# Patient Record
Sex: FEMALE | Race: WHITE | URBAN - METROPOLITAN AREA
[De-identification: names, ages, dates, MRNs, and addresses within clinical notes are randomized per-mention and may not be internally consistent; named-entity substitution may affect disease eponyms.]

---

## 2018-11-19 ENCOUNTER — HISTORICAL (OUTPATIENT)
Dept: ADMINISTRATIVE | Facility: HOSPITAL | Age: 73
End: 2018-11-19

## 2018-12-17 ENCOUNTER — HISTORICAL (OUTPATIENT)
Dept: ADMINISTRATIVE | Facility: HOSPITAL | Age: 73
End: 2018-12-17

## 2020-01-13 ENCOUNTER — HISTORICAL (OUTPATIENT)
Dept: CARDIOLOGY | Facility: HOSPITAL | Age: 75
End: 2020-01-13

## 2022-04-30 NOTE — OP NOTE
Patient:   Tami Howard             MRN: [id number]            FIN: [id number]               Age:   72 years     Sex:  Female     :  1945   Associated Diagnoses:   None   Author:   Shwetha LAST, Glenda GAR      NAME: Tami Howard  SURGEON:  Glenda Tadeo MD    YOB: 1945  DATE OF SURGERY:2018    PREOPERATIVE DIAGNOSIS:  Cataract, right eye.    POSTOPERATIVE DIAGNOSIS:  Cataract, right eye.    PROCEDURE:  Cataract extraction with intraocular lens placement, right eye.    HISTORY:  The patient is a 72-year-old female, who presented to the clinic with a 2+ nuclear sclerotic cataract causing difficulty in patient's activities of daily living. After thorough discussion of risks, benefits, alternatives and complications, the patient expressed understanding and wished to proceed with cataract extraction.  Goal of distance verified at bedside.  Vision blue will be used to aid in visualization.    PROCEDURE IN DETAIL:  On the day of surgery patient was brought to the preoperative holding area, where patient was given five drops each of phenylephrine, tropicamide and Cyclopentolate into the operative eye.  Patient was then brought to the operating room where patient was given Marcaine drops into the operative eye.  Patient was prepped and draped in normal sterile fashion.  A lid speculum was inserted.  Using operating microscope, 1.0-mm keratome was used to create a side port wound through which 1% lidocaine was injected, followed by air, Vision Blue, BSS, then Viscoat.  A 2.65 mm keratome was used to create triplanar phacoemulsification wound, through which continuous tear capsulorrhexis was performed using Utrata forceps and a cystotome. Hydrodissection and delineation were performed until lens was rotating freely in capsular bag.  Phacoemulsification was carried out in divide and conquer fashion to remove nuclear and epinuclear fragments.  I/A was used to remove cortex carefully.   Healon was injected into the capsular bag, which was found to be free of tears or defect.  A ZCBOO +17.5 diopter lens was inserted and carefully rotated into place.  Viscoelastic was removed from the eye.  Wounds were sealed using hydration. Lens was in excellent position at end of case.  Vigamox, Prednisolone, and Maxitrol ointment was placed into the patient's eye, which was patched and shielded.  The patient tolerated the procedure well and will followup tomorrow in clinic.

## 2022-04-30 NOTE — OP NOTE
Patient:   Tami Howard             MRN: [id number]            FIN: [id number]               Age:   73 years     Sex:  Female     :  1945   Associated Diagnoses:   None   Author:   Shwetha LAST, Glenda GAR      NAME: Tami Howard  SURGEON:  Glenda Tadeo MD    YOB: 1945  DATE OF SURGERY:18    PREOPERATIVE DIAGNOSIS:  Cataract, left eye.    POSTOPERATIVE DIAGNOSIS:  Cataract, left eye.    PROCEDURE:  Cataract extraction with intraocular lens placement, left eye.    HISTORY:  The patient is a 73-year-old female, who presented to the clinic with a 2+ nuclear sclerotic cataract causing difficulty in patient's activities of daily living. After thorough discussion of risks, benefits, alternatives and complications, the patient expressed understanding and wished to proceed with cataract extraction.  Goal of -0.40-0.50 verified at bedside.  Vision blue will be used to aid in visualization.    PROCEDURE IN DETAIL:  On the day of surgery patient was brought to the preoperative holding area, where patient was given five drops each of phenylephrine, tropicamide and Cyclopentolate into the operative eye.  Patient was then brought to the operating room where patient was given Marcaine drops into the operative eye.  Patient was prepped and draped in normal sterile fashion.  A lid speculum was inserted.  Using operating microscope, 1.0-mm keratome was used to create a side port wound through which 1% lidocaine was injected, followed by air, Vision Blue, BSS, then Viscoat.  A 2.65 mm keratome was used to create triplanar phacoemulsification wound, through which continuous tear capsulorrhexis was performed using Utrata forceps and a cystotome. Hydrodissection and delineation were performed until lens was rotating freely in capsular bag.  Phacoemulsification was carried out in divide and conquer fashion to remove nuclear and epinuclear fragments.  I/A was used to remove cortex carefully.   Healon was injected into the capsular bag, which was found to be free of tears or defect.  A ZCBOO +18.0 diopter lens was inserted and carefully rotated into place.  Viscoelastic was removed from the eye.  Wounds were sealed using hydration. Lens was in excellent position at end of case.  Vigamox, Prednisolone, and Maxitrol ointment was placed into the patient's eye, which was shielded.  The patient tolerated the procedure well and will followup tomorrow in clinic.

## 2024-10-07 DIAGNOSIS — M25.561 RIGHT KNEE PAIN: Primary | ICD-10-CM

## 2024-10-30 ENCOUNTER — OFFICE VISIT (OUTPATIENT)
Dept: ORTHOPEDICS | Facility: CLINIC | Age: 79
End: 2024-10-30
Payer: MEDICARE

## 2024-10-30 VITALS
BODY MASS INDEX: 31.55 KG/M2 | HEIGHT: 67 IN | WEIGHT: 201 LBS | SYSTOLIC BLOOD PRESSURE: 109 MMHG | DIASTOLIC BLOOD PRESSURE: 80 MMHG | HEART RATE: 64 BPM

## 2024-10-30 DIAGNOSIS — M17.11 PRIMARY OSTEOARTHRITIS OF RIGHT KNEE: Primary | ICD-10-CM

## 2024-10-30 DIAGNOSIS — Z86.718 HISTORY OF DVT OF LOWER EXTREMITY: ICD-10-CM

## 2024-10-30 PROCEDURE — 99202 OFFICE O/P NEW SF 15 MIN: CPT | Mod: ,,, | Performed by: PHYSICIAN ASSISTANT

## 2024-10-30 RX ORDER — MONTELUKAST SODIUM 10 MG/1
10 TABLET ORAL DAILY PRN
COMMUNITY
Start: 2024-07-24

## 2024-10-30 RX ORDER — ROPINIROLE 1 MG/1
3 TABLET, FILM COATED ORAL NIGHTLY
COMMUNITY
Start: 2024-09-22

## 2024-10-30 RX ORDER — SERTRALINE HYDROCHLORIDE 100 MG/1
200 TABLET, FILM COATED ORAL
COMMUNITY
Start: 2024-05-30

## 2024-10-30 RX ORDER — METOPROLOL SUCCINATE 25 MG/1
TABLET, EXTENDED RELEASE ORAL
COMMUNITY
Start: 2024-10-30

## 2024-10-30 RX ORDER — HYDROCHLOROTHIAZIDE 25 MG/1
25 TABLET ORAL
COMMUNITY
Start: 2024-10-25

## 2024-10-30 RX ORDER — LOSARTAN POTASSIUM 100 MG/1
100 TABLET ORAL
COMMUNITY
Start: 2024-09-22

## 2025-01-06 ENCOUNTER — ANESTHESIA EVENT (OUTPATIENT)
Dept: SURGERY | Facility: HOSPITAL | Age: 80
End: 2025-01-06
Payer: MEDICARE

## 2025-01-06 ENCOUNTER — HOSPITAL ENCOUNTER (OUTPATIENT)
Dept: RADIOLOGY | Facility: HOSPITAL | Age: 80
Discharge: HOME | End: 2025-01-06
Attending: PHYSICIAN ASSISTANT
Payer: MEDICARE

## 2025-01-06 ENCOUNTER — OFFICE VISIT (OUTPATIENT)
Dept: ORTHOPEDICS | Facility: CLINIC | Age: 80
End: 2025-01-06
Payer: MEDICARE

## 2025-01-06 VITALS
OXYGEN SATURATION: 99 % | WEIGHT: 201 LBS | BODY MASS INDEX: 31.55 KG/M2 | HEIGHT: 67 IN | SYSTOLIC BLOOD PRESSURE: 123 MMHG | HEART RATE: 71 BPM | DIASTOLIC BLOOD PRESSURE: 78 MMHG

## 2025-01-06 DIAGNOSIS — M17.11 PRIMARY OSTEOARTHRITIS OF RIGHT KNEE: Primary | ICD-10-CM

## 2025-01-06 DIAGNOSIS — Z01.812 PRE-OPERATIVE LABORATORY EXAMINATION: ICD-10-CM

## 2025-01-06 DIAGNOSIS — M17.11 PRIMARY OSTEOARTHRITIS OF RIGHT KNEE: ICD-10-CM

## 2025-01-06 DIAGNOSIS — R79.9 ABNORMAL BLOOD CHEMISTRY: ICD-10-CM

## 2025-01-06 LAB — MRSA PCR SCRN (OHS): NOT DETECTED

## 2025-01-06 PROCEDURE — 73700 CT LOWER EXTREMITY W/O DYE: CPT | Mod: TC,RT

## 2025-01-06 PROCEDURE — 71046 X-RAY EXAM CHEST 2 VIEWS: CPT | Mod: TC

## 2025-01-06 PROCEDURE — 99213 OFFICE O/P EST LOW 20 MIN: CPT | Mod: PBBFAC

## 2025-01-06 PROCEDURE — 99213 OFFICE O/P EST LOW 20 MIN: CPT | Mod: S$PBB,,, | Performed by: SPECIALIST

## 2025-01-06 RX ORDER — ACETAMINOPHEN 500 MG
1000 TABLET ORAL
OUTPATIENT
Start: 2025-01-06

## 2025-01-06 RX ORDER — KETOROLAC TROMETHAMINE 10 MG/1
10 TABLET, FILM COATED ORAL
OUTPATIENT
Start: 2025-01-06 | End: 2025-01-06

## 2025-01-06 RX ORDER — TRANEXAMIC ACID 650 MG/1
1950 TABLET ORAL
OUTPATIENT
Start: 2025-01-06 | End: 2025-01-06

## 2025-01-06 RX ORDER — SODIUM CHLORIDE, SODIUM GLUCONATE, SODIUM ACETATE, POTASSIUM CHLORIDE AND MAGNESIUM CHLORIDE 30; 37; 368; 526; 502 MG/100ML; MG/100ML; MG/100ML; MG/100ML; MG/100ML
INJECTION, SOLUTION INTRAVENOUS CONTINUOUS
OUTPATIENT
Start: 2025-01-06

## 2025-01-06 RX ORDER — TAMSULOSIN HYDROCHLORIDE 0.4 MG/1
0.4 CAPSULE ORAL
OUTPATIENT
Start: 2025-01-06

## 2025-01-06 RX ORDER — ONDANSETRON 4 MG/1
4 TABLET, ORALLY DISINTEGRATING ORAL
OUTPATIENT
Start: 2025-01-06

## 2025-01-06 RX ORDER — GABAPENTIN 100 MG/1
300 CAPSULE ORAL
OUTPATIENT
Start: 2025-01-06

## 2025-01-06 RX ORDER — SCOLOPAMINE TRANSDERMAL SYSTEM 1 MG/1
1 PATCH, EXTENDED RELEASE TRANSDERMAL ONCE AS NEEDED
OUTPATIENT
Start: 2025-01-06 | End: 2036-06-04

## 2025-01-06 NOTE — PROGRESS NOTES
Past Medical History:   Diagnosis Date    Arthritis     HTN (hypertension)     Mild intermittent asthma, uncomplicated     Restless leg     Sleep apnea, unspecified        Past Surgical History:   Procedure Laterality Date    ARTHROSCOPY,KNEE,WITH MENISCUS REPAIR Left      SECTION      And     CHOLECYSTECTOMY      EYE SURGERY      HYSTERECTOMY      JOINT REPLACEMENT      Lt knee    NASAL SINUS SURGERY      SHOULDER SURGERY Left     tailbone surgery      TOTAL KNEE ARTHROPLASTY Left        Current Outpatient Medications   Medication Sig    hydroCHLOROthiazide (HYDRODIURIL) 25 MG tablet Take 25 mg by mouth.    losartan (COZAAR) 100 MG tablet Take 100 mg by mouth.    metoprolol succinate (TOPROL-XL) 25 MG 24 hr tablet     montelukast (SINGULAIR) 10 mg tablet Take 10 mg by mouth daily as needed.    rOPINIRole (REQUIP) 1 MG tablet Take 3 mg by mouth every evening.    sertraline (ZOLOFT) 100 MG tablet Take 200 mg by mouth.     No current facility-administered medications for this visit.       Review of patient's allergies indicates:  No Known Allergies    Family History   Problem Relation Name Age of Onset    Asthma Mother Leydi Saini Tonny     Miscarriages / Stillbirths Father Jayshree Tonny     Depression Sister  Terri Ludwig     Hypertension Brother Warren Lancaster        Social History     Socioeconomic History    Marital status:    Tobacco Use    Smoking status: Never    Smokeless tobacco: Never   Substance and Sexual Activity    Alcohol use: Never    Drug use: Never    Sexual activity: Not Currently     Birth control/protection: Condom       Chief Complaint:   Chief Complaint   Patient presents with    Right Knee - Pre-op Exam       Consulting Physician: No ref. provider found    History of present illness:    This is a 79 y.o. year old female who complains of pain in the right knee secondary to advanced osteoarthrosis.  She has had left total knee arthroplasty in the past by   "Elieser Ayon in [place], [place].  The left total knee was 1 year ago.  She has had multiple corticosteroid injections as well as arthroscopy and physical therapy for her right knee.  The pain persists and she is ready to proceed with right total knee arthroplasty.    Review of Systems:    Constitution:   Denies chills, fever, and sweats.  HENT:   Denies headaches or blurry vision.  Cardiovascular:  Denies chest pain or irregular heart beat.  Respiratory:   Denies cough or shortness of breath.  Gastrointestinal:  Denies abdominal pain, nausea, or vomiting.  Musculoskeletal:   Denies muscle cramps.  Neurological:   Denies dizziness or focal weakness.  Psychiatric/Behavior: Normal mental status.  Hematology/Lymph:  Denies bleeding problem or easy bruising/bleeding.  Skin:    Denies rash or suspicious lesions.    Examination:    Vital Signs:    Vitals:    01/06/25 0934   BP: 123/78   Pulse: 71   SpO2: 99%   Weight: 91.2 kg (201 lb)   Height: 5' 7" (1.702 m)   PainSc:   9   PainLoc: Knee       Body mass index is 31.48 kg/m².    Constitution:   Well-developed, well nourished patient in no acute distress.  Neurological:   Alert and oriented x 3 and cooperative to examination.     Psychiatric/Behavior: Normal mental status.  Respiratory:   No shortness of breath.non labored breathing.  Cardiovascular: Regular rate and rhythm  Eyes:    Extraoccular muscles intact  Skin:    No scars, rash or suspicious lesions.    Physical Exam: Physical exam  General exam:  Well groomed, well nourished, no acute distress  Alert and oriented x3  HEENT:  Pupils are equal, round, and reactive to light, normocephalic, atraumatic  Cardiovascular:  S1 and S2 heard, no murmurs  Pulmonary:  Lungs clear to auscultation bilaterally  Gastrointestinal:  Positive bowel sounds, soft, nontender, nondistended        General Musculoskeletal Exam   Gait: antalgic       Right Knee Exam     Inspection   Erythema: absent  Effusion: present  Deformity: " present  Bruising: absent    Tenderness   The patient is tender to palpation of the medial joint line, MCL, condyle and medial retinaculum.    Crepitus   The patient has crepitus of the medial joint line.    Range of Motion   Extension: abnormal   Flexion: abnormal   10° to 110°    Tests   Meniscus   Felipe:  Medial - positive Lateral - negative  Ligament Examination   MCL - Valgus: normal (0 to 2mm)  LCL - Varus: normal  Patella   Passive Patellar Tilt: neutral    Other   Sensation: normal    Comments:  Varus deformity    Muscle Strength   Right Lower Extremity   Quadriceps:  5/5   Hamstrin/5     Vascular Exam     Right Pulses  Dorsalis Pedis:      2+  Posterior Tibial:      2+          Imaging: X-rays ordered and images interpreted today personally by me of four views of the right and left knees which show pristine interfaces and stable well-aligned left total knee arthroplasty.  The right knee is bone-on-bone in the medial compartment on weight-bearing films with grade 4 changes and sclerosis and associated varus deformity with tricompartmental osteophytes.  Impression: Advanced osteoarthrosis right knee and stable well-aligned left total knee arthroplasty.         Assessment: Primary osteoarthritis of right knee        Plan:  Robotic assisted right total knee arthroplasty    Risks, benefits, alternatives, and complications were explained to the patient and/or patient representative. They understand, agree, and want to proceed with the operation/ procedure.    History of DVT in the past the patient will be on Eliquis postoperatively for DVT prophylaxis.      DISCLAIMER: This note may have been dictated using voice recognition software and may contain grammatical errors.     NOTE: Consult report sent to referring provider via [email or web address].

## 2025-01-13 RX ORDER — IBUPROFEN 200 MG
200 TABLET ORAL EVERY 6 HOURS PRN
Status: ON HOLD | COMMUNITY
End: 2025-02-13 | Stop reason: HOSPADM

## 2025-01-13 RX ORDER — ALBUTEROL SULFATE 90 UG/1
1 INHALANT RESPIRATORY (INHALATION) EVERY 6 HOURS PRN
COMMUNITY
Start: 2024-12-17

## 2025-01-13 RX ORDER — MULTIVITAMIN
1 TABLET ORAL DAILY
COMMUNITY

## 2025-01-13 NOTE — CLINICAL REVIEW
labs/EKG/CXR reviewed. cardiology notes utd. Echo/Stress/Angio noted. CRA provided. chart review complete. ccv

## 2025-01-23 ENCOUNTER — ANESTHESIA (OUTPATIENT)
Dept: SURGERY | Facility: HOSPITAL | Age: 80
End: 2025-01-23
Payer: MEDICARE

## 2025-01-28 ENCOUNTER — TELEPHONE (OUTPATIENT)
Dept: ORTHOPEDICS | Facility: CLINIC | Age: 80
End: 2025-01-28
Payer: MEDICARE

## 2025-01-28 NOTE — TELEPHONE ENCOUNTER
Called pt to get her rescheduled for her sx that was cancelled due to weather on 1/23/25.    Pt did not answer, left detailed VM stating to call her office at her convenience to get this rescheduled.     Awaiting call back at this time.     Pt called back to reschedule her sx.    Called her back with a date, new sx date of 2/12/25 was agreed upon with pt and informed to call if she has any other questions or concerns prior to this date.    She verbalized a clear understanding.

## 2025-02-12 ENCOUNTER — HOSPITAL ENCOUNTER (INPATIENT)
Facility: HOSPITAL | Age: 80
LOS: 2 days | Discharge: HOME HEALTH | DRG: 470 | End: 2025-02-15
Attending: SPECIALIST | Admitting: SPECIALIST
Payer: MEDICARE

## 2025-02-12 DIAGNOSIS — M17.11 PRIMARY OSTEOARTHRITIS OF RIGHT KNEE: ICD-10-CM

## 2025-02-12 DIAGNOSIS — R79.9 ABNORMAL BLOOD CHEMISTRY: ICD-10-CM

## 2025-02-12 DIAGNOSIS — Z01.812 PRE-OPERATIVE LABORATORY EXAMINATION: ICD-10-CM

## 2025-02-12 LAB
HCO3 UR-SCNC: 30.1 MMOL/L (ref 24–28)
HCT VFR BLD AUTO: 37.1 % (ref 37–47)
HGB BLD-MCNC: 12.3 G/DL (ref 12–16)
PCO2 BLDA: 51 MMHG (ref 35–45)
PH SMN: 7.38 [PH] (ref 7.35–7.45)
PO2 BLDA: 69 MMHG (ref 80–100)
POC BE: 5 MMOL/L
POC SATURATED O2: 93 % (ref 95–100)
POC TCO2: 32 MMOL/L (ref 23–27)
POCT GLUCOSE: 115 MG/DL (ref 70–110)
POCT GLUCOSE: 120 MG/DL (ref 70–110)
POCT GLUCOSE: 121 MG/DL (ref 70–110)
POCT GLUCOSE: 83 MG/DL (ref 70–110)
SAMPLE: ABNORMAL

## 2025-02-12 PROCEDURE — 85018 HEMOGLOBIN: CPT | Performed by: SPECIALIST

## 2025-02-12 PROCEDURE — 71000033 HC RECOVERY, INTIAL HOUR: Performed by: SPECIALIST

## 2025-02-12 PROCEDURE — G0378 HOSPITAL OBSERVATION PER HR: HCPCS

## 2025-02-12 PROCEDURE — 82962 GLUCOSE BLOOD TEST: CPT | Performed by: SPECIALIST

## 2025-02-12 PROCEDURE — 25000003 PHARM REV CODE 250: Performed by: SPECIALIST

## 2025-02-12 PROCEDURE — 94761 N-INVAS EAR/PLS OXIMETRY MLT: CPT | Mod: XB

## 2025-02-12 PROCEDURE — 94799 UNLISTED PULMONARY SVC/PX: CPT

## 2025-02-12 PROCEDURE — 51798 US URINE CAPACITY MEASURE: CPT | Performed by: SPECIALIST

## 2025-02-12 PROCEDURE — 63600175 PHARM REV CODE 636 W HCPCS: Performed by: ANESTHESIOLOGY

## 2025-02-12 PROCEDURE — 25000003 PHARM REV CODE 250: Performed by: NURSE PRACTITIONER

## 2025-02-12 PROCEDURE — 27000221 HC OXYGEN, UP TO 24 HOURS

## 2025-02-12 PROCEDURE — 37000009 HC ANESTHESIA EA ADD 15 MINS: Performed by: SPECIALIST

## 2025-02-12 PROCEDURE — C1776 JOINT DEVICE (IMPLANTABLE): HCPCS | Performed by: SPECIALIST

## 2025-02-12 PROCEDURE — 99900031 HC PATIENT EDUCATION (STAT)

## 2025-02-12 PROCEDURE — 63600175 PHARM REV CODE 636 W HCPCS: Performed by: SPECIALIST

## 2025-02-12 PROCEDURE — 88311 DECALCIFY TISSUE: CPT

## 2025-02-12 PROCEDURE — C1713 ANCHOR/SCREW BN/BN,TIS/BN: HCPCS | Performed by: SPECIALIST

## 2025-02-12 PROCEDURE — 63600175 PHARM REV CODE 636 W HCPCS: Mod: JZ,TB | Performed by: NURSE ANESTHETIST, CERTIFIED REGISTERED

## 2025-02-12 PROCEDURE — 0SRC0JZ REPLACEMENT OF RIGHT KNEE JOINT WITH SYNTHETIC SUBSTITUTE, OPEN APPROACH: ICD-10-PCS | Performed by: SPECIALIST

## 2025-02-12 PROCEDURE — 99900035 HC TECH TIME PER 15 MIN (STAT)

## 2025-02-12 PROCEDURE — 36000712 HC OR TIME LEV V 1ST 15 MIN: Performed by: SPECIALIST

## 2025-02-12 PROCEDURE — 51798 US URINE CAPACITY MEASURE: CPT

## 2025-02-12 PROCEDURE — 97162 PT EVAL MOD COMPLEX 30 MIN: CPT

## 2025-02-12 PROCEDURE — 63600175 PHARM REV CODE 636 W HCPCS: Performed by: NURSE PRACTITIONER

## 2025-02-12 PROCEDURE — 25000003 PHARM REV CODE 250: Performed by: NURSE ANESTHETIST, CERTIFIED REGISTERED

## 2025-02-12 PROCEDURE — 27201423 OPTIME MED/SURG SUP & DEVICES STERILE SUPPLY: Performed by: SPECIALIST

## 2025-02-12 PROCEDURE — 88305 TISSUE EXAM BY PATHOLOGIST: CPT | Performed by: SPECIALIST

## 2025-02-12 PROCEDURE — 63600175 PHARM REV CODE 636 W HCPCS: Performed by: NURSE ANESTHETIST, CERTIFIED REGISTERED

## 2025-02-12 PROCEDURE — A4216 STERILE WATER/SALINE, 10 ML: HCPCS | Performed by: SPECIALIST

## 2025-02-12 PROCEDURE — 82803 BLOOD GASES ANY COMBINATION: CPT

## 2025-02-12 PROCEDURE — 36000713 HC OR TIME LEV V EA ADD 15 MIN: Performed by: SPECIALIST

## 2025-02-12 PROCEDURE — 64447 NJX AA&/STRD FEMORAL NRV IMG: CPT | Performed by: ANESTHESIOLOGY

## 2025-02-12 PROCEDURE — 36600 WITHDRAWAL OF ARTERIAL BLOOD: CPT

## 2025-02-12 PROCEDURE — 37000008 HC ANESTHESIA 1ST 15 MINUTES: Performed by: SPECIALIST

## 2025-02-12 PROCEDURE — 25000003 PHARM REV CODE 250: Performed by: PHYSICIAN ASSISTANT

## 2025-02-12 PROCEDURE — 63600175 PHARM REV CODE 636 W HCPCS: Performed by: PHYSICIAN ASSISTANT

## 2025-02-12 PROCEDURE — 8E0Y0CZ ROBOTIC ASSISTED PROCEDURE OF LOWER EXTREMITY, OPEN APPROACH: ICD-10-PCS | Performed by: SPECIALIST

## 2025-02-12 DEVICE — TIBIAL BEARING INSERT - CS
Type: IMPLANTABLE DEVICE | Site: KNEE | Status: FUNCTIONAL
Brand: TRIATHLON

## 2025-02-12 DEVICE — TIBIAL COMPONENT
Type: IMPLANTABLE DEVICE | Site: KNEE | Status: FUNCTIONAL
Brand: TRIATHLON

## 2025-02-12 DEVICE — CRUCIATE RETAINING FEMORAL
Type: IMPLANTABLE DEVICE | Site: KNEE | Status: FUNCTIONAL
Brand: TRIATHLON

## 2025-02-12 RX ORDER — MONTELUKAST SODIUM 5 MG/1
10 TABLET, CHEWABLE ORAL
Status: DISCONTINUED | OUTPATIENT
Start: 2025-02-12 | End: 2025-02-15 | Stop reason: HOSPADM

## 2025-02-12 RX ORDER — MORPHINE SULFATE 10 MG/ML
INJECTION INTRAMUSCULAR; INTRAVENOUS; SUBCUTANEOUS
Status: DISPENSED
Start: 2025-02-12 | End: 2025-02-12

## 2025-02-12 RX ORDER — TALC
6 POWDER (GRAM) TOPICAL NIGHTLY PRN
Status: DISCONTINUED | OUTPATIENT
Start: 2025-02-12 | End: 2025-02-15 | Stop reason: HOSPADM

## 2025-02-12 RX ORDER — PHENYLEPHRINE HYDROCHLORIDE 10 MG/ML
INJECTION INTRAVENOUS
Status: DISCONTINUED | OUTPATIENT
Start: 2025-02-12 | End: 2025-02-12

## 2025-02-12 RX ORDER — ONDANSETRON HYDROCHLORIDE 2 MG/ML
4 INJECTION, SOLUTION INTRAVENOUS EVERY 6 HOURS PRN
Status: DISCONTINUED | OUTPATIENT
Start: 2025-02-12 | End: 2025-02-15 | Stop reason: HOSPADM

## 2025-02-12 RX ORDER — METOPROLOL SUCCINATE 25 MG/1
25 TABLET, EXTENDED RELEASE ORAL NIGHTLY
Status: DISCONTINUED | OUTPATIENT
Start: 2025-02-12 | End: 2025-02-15 | Stop reason: HOSPADM

## 2025-02-12 RX ORDER — CEFAZOLIN 2 G/1
2 INJECTION, POWDER, FOR SOLUTION INTRAMUSCULAR; INTRAVENOUS
Status: COMPLETED | OUTPATIENT
Start: 2025-02-12 | End: 2025-02-12

## 2025-02-12 RX ORDER — SODIUM CHLORIDE 0.9 % (FLUSH) 0.9 %
SYRINGE (ML) INJECTION
Status: DISPENSED
Start: 2025-02-12 | End: 2025-02-12

## 2025-02-12 RX ORDER — NAPROXEN SODIUM 220 MG/1
81 TABLET, FILM COATED ORAL 2 TIMES DAILY
Status: DISCONTINUED | OUTPATIENT
Start: 2025-02-13 | End: 2025-02-12

## 2025-02-12 RX ORDER — METOCLOPRAMIDE HYDROCHLORIDE 5 MG/ML
10 INJECTION INTRAMUSCULAR; INTRAVENOUS
Status: COMPLETED | OUTPATIENT
Start: 2025-02-12 | End: 2025-02-13

## 2025-02-12 RX ORDER — ONDANSETRON HYDROCHLORIDE 2 MG/ML
INJECTION, SOLUTION INTRAVENOUS
Status: DISCONTINUED | OUTPATIENT
Start: 2025-02-12 | End: 2025-02-12

## 2025-02-12 RX ORDER — GLYCOPYRROLATE 0.2 MG/ML
INJECTION INTRAMUSCULAR; INTRAVENOUS
Status: DISCONTINUED | OUTPATIENT
Start: 2025-02-12 | End: 2025-02-12

## 2025-02-12 RX ORDER — ADHESIVE BANDAGE
30 BANDAGE TOPICAL DAILY PRN
Status: DISCONTINUED | OUTPATIENT
Start: 2025-02-12 | End: 2025-02-15 | Stop reason: HOSPADM

## 2025-02-12 RX ORDER — KETOROLAC TROMETHAMINE 10 MG/1
10 TABLET, FILM COATED ORAL
Status: DISCONTINUED | OUTPATIENT
Start: 2025-02-12 | End: 2025-02-14

## 2025-02-12 RX ORDER — KETOROLAC TROMETHAMINE 30 MG/ML
INJECTION, SOLUTION INTRAMUSCULAR; INTRAVENOUS
Status: DISPENSED
Start: 2025-02-12 | End: 2025-02-12

## 2025-02-12 RX ORDER — FAMOTIDINE 20 MG/1
20 TABLET, FILM COATED ORAL 2 TIMES DAILY
Status: DISCONTINUED | OUTPATIENT
Start: 2025-02-12 | End: 2025-02-15 | Stop reason: HOSPADM

## 2025-02-12 RX ORDER — BUPIVACAINE HYDROCHLORIDE 2.5 MG/ML
INJECTION, SOLUTION EPIDURAL; INFILTRATION; INTRACAUDAL
Status: COMPLETED
Start: 2025-02-12 | End: 2025-02-12

## 2025-02-12 RX ORDER — EPINEPHRINE 1 MG/ML
INJECTION, SOLUTION, CONCENTRATE INTRAVENOUS
Status: DISCONTINUED | OUTPATIENT
Start: 2025-02-12 | End: 2025-02-12 | Stop reason: HOSPADM

## 2025-02-12 RX ORDER — GABAPENTIN 300 MG/1
300 CAPSULE ORAL NIGHTLY
Status: DISCONTINUED | OUTPATIENT
Start: 2025-02-12 | End: 2025-02-13

## 2025-02-12 RX ORDER — HYDROCHLOROTHIAZIDE 25 MG/1
25 TABLET ORAL NIGHTLY
Status: DISCONTINUED | OUTPATIENT
Start: 2025-02-12 | End: 2025-02-15 | Stop reason: HOSPADM

## 2025-02-12 RX ORDER — MORPHINE SULFATE 10 MG/ML
INJECTION INTRAMUSCULAR; INTRAVENOUS; SUBCUTANEOUS
Status: DISCONTINUED | OUTPATIENT
Start: 2025-02-12 | End: 2025-02-12 | Stop reason: HOSPADM

## 2025-02-12 RX ORDER — MORPHINE SULFATE 4 MG/ML
4 INJECTION, SOLUTION INTRAMUSCULAR; INTRAVENOUS
Status: ACTIVE | OUTPATIENT
Start: 2025-02-12 | End: 2025-02-13

## 2025-02-12 RX ORDER — ACETAMINOPHEN 10 MG/ML
1000 INJECTION, SOLUTION INTRAVENOUS ONCE
Status: COMPLETED | OUTPATIENT
Start: 2025-02-12 | End: 2025-02-12

## 2025-02-12 RX ORDER — GLUCAGON 1 MG
1 KIT INJECTION
Status: DISCONTINUED | OUTPATIENT
Start: 2025-02-12 | End: 2025-02-12 | Stop reason: HOSPADM

## 2025-02-12 RX ORDER — LOSARTAN POTASSIUM 50 MG/1
100 TABLET ORAL NIGHTLY
Status: DISCONTINUED | OUTPATIENT
Start: 2025-02-12 | End: 2025-02-15 | Stop reason: HOSPADM

## 2025-02-12 RX ORDER — ALUMINUM HYDROXIDE, MAGNESIUM HYDROXIDE, AND SIMETHICONE 1200; 120; 1200 MG/30ML; MG/30ML; MG/30ML
30 SUSPENSION ORAL EVERY 6 HOURS PRN
Status: DISCONTINUED | OUTPATIENT
Start: 2025-02-12 | End: 2025-02-15 | Stop reason: HOSPADM

## 2025-02-12 RX ORDER — AMOXICILLIN 250 MG
2 CAPSULE ORAL 2 TIMES DAILY
Status: DISCONTINUED | OUTPATIENT
Start: 2025-02-12 | End: 2025-02-15 | Stop reason: HOSPADM

## 2025-02-12 RX ORDER — SODIUM CHLORIDE, SODIUM GLUCONATE, SODIUM ACETATE, POTASSIUM CHLORIDE AND MAGNESIUM CHLORIDE 30; 37; 368; 526; 502 MG/100ML; MG/100ML; MG/100ML; MG/100ML; MG/100ML
INJECTION, SOLUTION INTRAVENOUS CONTINUOUS
Status: DISCONTINUED | OUTPATIENT
Start: 2025-02-12 | End: 2025-02-13

## 2025-02-12 RX ORDER — KETOROLAC TROMETHAMINE 10 MG/1
10 TABLET, FILM COATED ORAL
Status: COMPLETED | OUTPATIENT
Start: 2025-02-12 | End: 2025-02-12

## 2025-02-12 RX ORDER — POLYETHYLENE GLYCOL 3350 17 G/17G
17 POWDER, FOR SOLUTION ORAL NIGHTLY
Status: DISCONTINUED | OUTPATIENT
Start: 2025-02-12 | End: 2025-02-15 | Stop reason: HOSPADM

## 2025-02-12 RX ORDER — BISACODYL 10 MG/1
10 SUPPOSITORY RECTAL DAILY
Status: DISCONTINUED | OUTPATIENT
Start: 2025-02-15 | End: 2025-02-15 | Stop reason: HOSPADM

## 2025-02-12 RX ORDER — TRAMADOL HYDROCHLORIDE 50 MG/1
50 TABLET ORAL EVERY 4 HOURS PRN
Status: DISCONTINUED | OUTPATIENT
Start: 2025-02-12 | End: 2025-02-13

## 2025-02-12 RX ORDER — NALOXONE HCL 0.4 MG/ML
0.2 VIAL (ML) INJECTION
Status: DISCONTINUED | OUTPATIENT
Start: 2025-02-12 | End: 2025-02-15 | Stop reason: HOSPADM

## 2025-02-12 RX ORDER — VANCOMYCIN HYDROCHLORIDE 1 G/20ML
INJECTION, POWDER, LYOPHILIZED, FOR SOLUTION INTRAVENOUS
Status: DISCONTINUED | OUTPATIENT
Start: 2025-02-12 | End: 2025-02-12 | Stop reason: HOSPADM

## 2025-02-12 RX ORDER — EPINEPHRINE 1 MG/ML
INJECTION, SOLUTION, CONCENTRATE INTRAVENOUS
Status: DISPENSED
Start: 2025-02-12 | End: 2025-02-12

## 2025-02-12 RX ORDER — BUPIVACAINE HYDROCHLORIDE 7.5 MG/ML
INJECTION, SOLUTION EPIDURAL; RETROBULBAR
Status: COMPLETED | OUTPATIENT
Start: 2025-02-12 | End: 2025-02-12

## 2025-02-12 RX ORDER — SERTRALINE HYDROCHLORIDE 50 MG/1
100 TABLET, FILM COATED ORAL NIGHTLY
Status: DISCONTINUED | OUTPATIENT
Start: 2025-02-12 | End: 2025-02-15 | Stop reason: HOSPADM

## 2025-02-12 RX ORDER — ROPINIROLE 1 MG/1
3 TABLET, FILM COATED ORAL NIGHTLY
Status: DISCONTINUED | OUTPATIENT
Start: 2025-02-12 | End: 2025-02-15 | Stop reason: HOSPADM

## 2025-02-12 RX ORDER — KETOROLAC TROMETHAMINE 30 MG/ML
15 INJECTION, SOLUTION INTRAMUSCULAR; INTRAVENOUS EVERY 8 HOURS PRN
Status: DISCONTINUED | OUTPATIENT
Start: 2025-02-12 | End: 2025-02-12 | Stop reason: HOSPADM

## 2025-02-12 RX ORDER — ALBUTEROL SULFATE 90 UG/1
1 INHALANT RESPIRATORY (INHALATION) EVERY 6 HOURS PRN
Status: DISCONTINUED | OUTPATIENT
Start: 2025-02-12 | End: 2025-02-15 | Stop reason: HOSPADM

## 2025-02-12 RX ORDER — METHOCARBAMOL 750 MG/1
750 TABLET, FILM COATED ORAL EVERY 8 HOURS PRN
Status: DISCONTINUED | OUTPATIENT
Start: 2025-02-12 | End: 2025-02-13

## 2025-02-12 RX ORDER — HYDROCODONE BITARTRATE AND ACETAMINOPHEN 5; 325 MG/1; MG/1
1 TABLET ORAL EVERY 4 HOURS PRN
Status: DISCONTINUED | OUTPATIENT
Start: 2025-02-12 | End: 2025-02-13

## 2025-02-12 RX ORDER — MIDAZOLAM HYDROCHLORIDE 1 MG/ML
INJECTION INTRAMUSCULAR; INTRAVENOUS
Status: DISCONTINUED | OUTPATIENT
Start: 2025-02-12 | End: 2025-02-12

## 2025-02-12 RX ORDER — VANCOMYCIN HYDROCHLORIDE 1 G/20ML
INJECTION, POWDER, LYOPHILIZED, FOR SOLUTION INTRAVENOUS
Status: DISPENSED
Start: 2025-02-12 | End: 2025-02-12

## 2025-02-12 RX ORDER — SODIUM CHLORIDE 9 MG/ML
INJECTION, SOLUTION INTRAMUSCULAR; INTRAVENOUS; SUBCUTANEOUS
Status: DISCONTINUED | OUTPATIENT
Start: 2025-02-12 | End: 2025-02-12 | Stop reason: HOSPADM

## 2025-02-12 RX ORDER — NALOXONE HCL 0.4 MG/ML
0.2 VIAL (ML) INJECTION ONCE
Status: COMPLETED | OUTPATIENT
Start: 2025-02-12 | End: 2025-02-12

## 2025-02-12 RX ORDER — KETOROLAC TROMETHAMINE 30 MG/ML
INJECTION, SOLUTION INTRAMUSCULAR; INTRAVENOUS
Status: DISCONTINUED | OUTPATIENT
Start: 2025-02-12 | End: 2025-02-12 | Stop reason: HOSPADM

## 2025-02-12 RX ORDER — ROPIVACAINE HYDROCHLORIDE 5 MG/ML
INJECTION, SOLUTION EPIDURAL; INFILTRATION; PERINEURAL
Status: DISCONTINUED | OUTPATIENT
Start: 2025-02-12 | End: 2025-02-12 | Stop reason: HOSPADM

## 2025-02-12 RX ORDER — SCOPOLAMINE 1 MG/3D
1 PATCH, EXTENDED RELEASE TRANSDERMAL ONCE AS NEEDED
Status: DISCONTINUED | OUTPATIENT
Start: 2025-02-12 | End: 2025-02-12 | Stop reason: HOSPADM

## 2025-02-12 RX ORDER — TRANEXAMIC ACID 650 MG/1
1950 TABLET ORAL
Status: COMPLETED | OUTPATIENT
Start: 2025-02-12 | End: 2025-02-12

## 2025-02-12 RX ORDER — CEFAZOLIN 2 G/1
2 INJECTION, POWDER, FOR SOLUTION INTRAMUSCULAR; INTRAVENOUS
Status: COMPLETED | OUTPATIENT
Start: 2025-02-12 | End: 2025-02-13

## 2025-02-12 RX ORDER — DOCUSATE SODIUM 100 MG/1
200 CAPSULE, LIQUID FILLED ORAL
Status: DISCONTINUED | OUTPATIENT
Start: 2025-02-13 | End: 2025-02-15 | Stop reason: HOSPADM

## 2025-02-12 RX ORDER — BUPIVACAINE HYDROCHLORIDE 2.5 MG/ML
INJECTION, SOLUTION EPIDURAL; INFILTRATION; INTRACAUDAL
Status: DISCONTINUED | OUTPATIENT
Start: 2025-02-12 | End: 2025-02-12

## 2025-02-12 RX ORDER — GABAPENTIN 300 MG/1
300 CAPSULE ORAL
Status: COMPLETED | OUTPATIENT
Start: 2025-02-12 | End: 2025-02-12

## 2025-02-12 RX ORDER — HYDROMORPHONE HYDROCHLORIDE 2 MG/ML
0.2 INJECTION, SOLUTION INTRAMUSCULAR; INTRAVENOUS; SUBCUTANEOUS EVERY 5 MIN PRN
Status: DISCONTINUED | OUTPATIENT
Start: 2025-02-12 | End: 2025-02-12 | Stop reason: HOSPADM

## 2025-02-12 RX ORDER — SODIUM CHLORIDE 9 MG/ML
INJECTION, SOLUTION INTRAVENOUS CONTINUOUS
Status: ACTIVE | OUTPATIENT
Start: 2025-02-12 | End: 2025-02-13

## 2025-02-12 RX ORDER — ONDANSETRON 4 MG/1
4 TABLET, ORALLY DISINTEGRATING ORAL
Status: COMPLETED | OUTPATIENT
Start: 2025-02-12 | End: 2025-02-12

## 2025-02-12 RX ORDER — HYDRALAZINE HYDROCHLORIDE 20 MG/ML
10 INJECTION INTRAMUSCULAR; INTRAVENOUS ONCE
Status: DISCONTINUED | OUTPATIENT
Start: 2025-02-12 | End: 2025-02-12 | Stop reason: HOSPADM

## 2025-02-12 RX ORDER — PROPOFOL 10 MG/ML
VIAL (ML) INTRAVENOUS
Status: DISCONTINUED | OUTPATIENT
Start: 2025-02-12 | End: 2025-02-12

## 2025-02-12 RX ADMIN — SERTRALINE HYDROCHLORIDE 100 MG: 50 TABLET ORAL at 08:02

## 2025-02-12 RX ADMIN — ONDANSETRON HYDROCHLORIDE 4 MG: 2 SOLUTION INTRAMUSCULAR; INTRAVENOUS at 12:02

## 2025-02-12 RX ADMIN — GABAPENTIN 300 MG: 300 CAPSULE ORAL at 10:02

## 2025-02-12 RX ADMIN — NALOXONE HYDROCHLORIDE 0.2 MG: 0.4 INJECTION, SOLUTION INTRAMUSCULAR; INTRAVENOUS; SUBCUTANEOUS at 04:02

## 2025-02-12 RX ADMIN — GABAPENTIN 300 MG: 300 CAPSULE ORAL at 08:02

## 2025-02-12 RX ADMIN — PROPOFOL 25 MCG/KG/MIN: 10 INJECTION, EMULSION INTRAVENOUS at 12:02

## 2025-02-12 RX ADMIN — TRANEXAMIC ACID 1950 MG: 650 TABLET ORAL at 10:02

## 2025-02-12 RX ADMIN — SENNOSIDES AND DOCUSATE SODIUM 2 TABLET: 50; 8.6 TABLET ORAL at 08:02

## 2025-02-12 RX ADMIN — FAMOTIDINE 20 MG: 20 TABLET, FILM COATED ORAL at 08:02

## 2025-02-12 RX ADMIN — SODIUM CHLORIDE: 9 INJECTION, SOLUTION INTRAVENOUS at 03:02

## 2025-02-12 RX ADMIN — METOCLOPRAMIDE HYDROCHLORIDE 10 MG: 5 INJECTION INTRAMUSCULAR; INTRAVENOUS at 03:02

## 2025-02-12 RX ADMIN — METOCLOPRAMIDE HYDROCHLORIDE 10 MG: 5 INJECTION INTRAMUSCULAR; INTRAVENOUS at 07:02

## 2025-02-12 RX ADMIN — PHENYLEPHRINE HYDROCHLORIDE 30 MCG/MIN: 10 INJECTION INTRAVENOUS at 12:02

## 2025-02-12 RX ADMIN — MIDAZOLAM 2 MG: 1 INJECTION INTRAMUSCULAR; INTRAVENOUS at 12:02

## 2025-02-12 RX ADMIN — KETOROLAC TROMETHAMINE 10 MG: 10 TABLET, FILM COATED ORAL at 09:02

## 2025-02-12 RX ADMIN — ACETAMINOPHEN 1000 MG: 10 INJECTION INTRAVENOUS at 05:02

## 2025-02-12 RX ADMIN — PROPOFOL 150 MG: 10 INJECTION, EMULSION INTRAVENOUS at 12:02

## 2025-02-12 RX ADMIN — KETOROLAC TROMETHAMINE 10 MG: 10 TABLET, FILM COATED ORAL at 05:02

## 2025-02-12 RX ADMIN — BUPIVACAINE HYDROCHLORIDE 30 ML: 2.5 INJECTION, SOLUTION EPIDURAL; INFILTRATION; INTRACAUDAL; PERINEURAL at 02:02

## 2025-02-12 RX ADMIN — PHENYLEPHRINE HYDROCHLORIDE 200 MCG: 10 INJECTION INTRAVENOUS at 01:02

## 2025-02-12 RX ADMIN — SODIUM CHLORIDE, SODIUM GLUCONATE, SODIUM ACETATE, POTASSIUM CHLORIDE AND MAGNESIUM CHLORIDE: 526; 502; 368; 37; 30 INJECTION, SOLUTION INTRAVENOUS at 10:02

## 2025-02-12 RX ADMIN — CEFAZOLIN 2 G: 2 INJECTION, POWDER, FOR SOLUTION INTRAMUSCULAR; INTRAVENOUS at 05:02

## 2025-02-12 RX ADMIN — KETOROLAC TROMETHAMINE 10 MG: 10 TABLET, FILM COATED ORAL at 10:02

## 2025-02-12 RX ADMIN — PHENYLEPHRINE HYDROCHLORIDE 100 MCG: 10 INJECTION INTRAVENOUS at 01:02

## 2025-02-12 RX ADMIN — ONDANSETRON 4 MG: 4 TABLET, ORALLY DISINTEGRATING ORAL at 10:02

## 2025-02-12 RX ADMIN — POLYETHYLENE GLYCOL 3350 17 G: 17 POWDER, FOR SOLUTION ORAL at 08:02

## 2025-02-12 RX ADMIN — GLYCOPYRROLATE 0.2 MG: 0.2 INJECTION INTRAMUSCULAR; INTRAVENOUS at 12:02

## 2025-02-12 RX ADMIN — SODIUM CHLORIDE 250 ML: 9 INJECTION, SOLUTION INTRAVENOUS at 03:02

## 2025-02-12 RX ADMIN — PHENYLEPHRINE HYDROCHLORIDE 100 MCG: 10 INJECTION INTRAVENOUS at 12:02

## 2025-02-12 RX ADMIN — SODIUM CHLORIDE, SODIUM GLUCONATE, SODIUM ACETATE, POTASSIUM CHLORIDE AND MAGNESIUM CHLORIDE 400 ML/HR: 526; 502; 368; 37; 30 INJECTION, SOLUTION INTRAVENOUS at 01:02

## 2025-02-12 RX ADMIN — CEFAZOLIN 2 G: 2 INJECTION, POWDER, FOR SOLUTION INTRAMUSCULAR; INTRAVENOUS at 12:02

## 2025-02-12 RX ADMIN — BUPIVACAINE HYDROCHLORIDE 1.2 ML: 7.5 INJECTION, SOLUTION EPIDURAL; RETROBULBAR at 12:02

## 2025-02-12 NOTE — TRANSFER OF CARE
"Anesthesia Transfer of Care Note    Patient: Tami Howard    Procedure(s) Performed: Procedure(s) (LRB):  ROBOTIC ARTHROPLASTY, KNEE, TOTAL (Right)    Patient location: PACU    Anesthesia Type: general    Transport from OR: Transported from OR on room air with adequate spontaneous ventilation    Post pain: adequate analgesia    Post assessment: no apparent anesthetic complications    Post vital signs: stable    Level of consciousness: sedated and awake    Nausea/Vomiting: no nausea/vomiting    Complications: none    Transfer of care protocol was followed    Last vitals: Visit Vitals  BP (!) 118/52   Pulse 68   Temp 36 °C (96.8 °F)   Resp (!) 21   Ht 5' 7" (1.702 m)   Wt 90.7 kg (200 lb)   SpO2 (!) 94%   Breastfeeding No   BMI 31.32 kg/m²     "

## 2025-02-12 NOTE — ANESTHESIA PROCEDURE NOTES
Peripheral Block    Patient location during procedure: post-op   Block not for primary anesthetic.  Reason for block: at surgeon's request and post-op pain management   Post-op Pain Location: R Knee Pain   Start time: 2/12/2025 2:24 PM  Timeout: 2/12/2025 2:20 PM     Staffing  Authorizing Provider: Jose Tripp MD  Performing Provider: Jose Tripp MD    Staffing  Performed by: Jose Tripp MD  Authorized by: Jose Tripp MD    Preanesthetic Checklist  Completed: patient identified, IV checked, site marked, risks and benefits discussed, surgical consent, monitors and equipment checked, pre-op evaluation and timeout performed  Peripheral Block  Patient position: supine  Prep: ChloraPrep  Patient monitoring: heart rate, cardiac monitor, continuous pulse ox, continuous capnometry and frequent blood pressure checks  Block type: adductor canal  Laterality: right  Injection technique: single shot  Needle  Needle type: Stimuplex   Needle gauge: 22 G  Needle length: 3.5 in  Needle localization: anatomical landmarks and ultrasound guidance  Needle insertion depth: 2 cm   -ultrasound image captured on disc.  Assessment  Injection assessment: negative aspiration, negative parasthesia and local visualized surrounding nerve  Paresthesia pain: none  Heart rate change: no  Slow fractionated injection: yes  Pain Tolerance: comfortable throughout block and no complaints  Medications:    Medications: bupivacaine (pf) (MARCAINE) injection 0.25% - Perineural   30 mL - 2/12/2025 2:24:00 PM    Additional Notes  VSS.  [facility] RN monitoring vitals throughout procedure.  Patient tolerated procedure well.

## 2025-02-12 NOTE — PROGRESS NOTES
Patient experiencing a delayed response to anesthesia and hypotension. Will convert to observation status, Will initiate our pre-emptive multimodal pain mgt protocol, provide post-anesthesia monitoring/vital signs and perform frequent pain assessments. Will plan for discharge once the patient is tolerating pain and pain medications appropriately and the patient has been cleared from a safety/mobility/medical standpoint.        Co-morbidities mgt:   Hx VTE/PE/DVT - Pierre, HERMILO hose, Eliquis for DVT prophylaxis, Early ambulation    Sleep Apnea - Oxygen PRN, Monitor oxygen saturation    Restless Leg syndrome - Home meds restarted    Asthma - Nebs PRN, Oxygen PRN    Obesity - BMI 31    Hypertension - Home meds restarted, monitor closely and adjust plan of care accordingly.   Patient currently hypotensive, we will bolus with normal saline and monitor closely.  Hold night blood pressure medicines for BP less than 110/60.    Lethargy/excessive sedation due to general anesthesia  Narcan 0.2mg x 2 doses given.  ABGs now.  We will hold off all narcotics until mental status improves.

## 2025-02-12 NOTE — ANESTHESIA PROCEDURE NOTES
Intubation    Date/Time: 2/12/2025 12:25 PM    Performed by: Efren Barrett CRNA  Authorized by: Jose Tripp MD    Intubation:     Induction:  Intravenous    Intubated:  Postinduction    Mask Ventilation:  Easy mask    Attempts:  1    Attempted By:  CRNA    Difficult Airway Encountered?: No      Airway Device:  Supraglottic airway/LMA    Airway Device Size:  5.0    Style/Cuff Inflation:  Cuffed (inflated to minimal occlusive pressure)    Placement Verified By:  Capnometry    Complicating Factors:  None    Findings Post-Intubation:  BS equal bilateral and atraumatic/condition of teeth unchanged

## 2025-02-12 NOTE — ANESTHESIA PROCEDURE NOTES
Spinal    Diagnosis: Osteoarthritis  Patient location during procedure: OR  Start time: 2/12/2025 12:13 PM  Timeout: 2/12/2025 12:12 PM  End time: 2/12/2025 12:18 PM    Staffing  Authorizing Provider: Jose Tripp MD  Performing Provider: Jose Tripp MD    Staffing  Performed by: Jose Tripp MD  Authorized by: Jose Tripp MD    Preanesthetic Checklist  Completed: patient identified, IV checked, site marked, risks and benefits discussed, surgical consent, monitors and equipment checked, pre-op evaluation and timeout performed  Spinal Block  Patient position: sitting  Prep: ChloraPrep  Patient monitoring: heart rate, cardiac monitor, continuous pulse ox and frequent blood pressure checks  Approach: midline  Location: L3-4  Injection technique: single shot  CSF Fluid: clear free-flowing CSF  Needle  Needle type: pencil-tip   Needle gauge: 25 G  Needle length: 3.5 in  Additional Documentation: incremental injection, negative aspiration for heme and no paresthesia on injection  Needle localization: anatomical landmarks  Assessment  Sensory level: T8   Dermatomal levels determined by pinch or prick  Ease of block: easy  Patient's tolerance of the procedure: comfortable throughout block and no complaints  Medications:    Medications: bupivacaine (pf) (MARCAINE) injection 0.75% - Intraspinal   1.2 mL - 2/12/2025 12:16:00 PM

## 2025-02-12 NOTE — OP NOTE
DATE OF PROCEDURE: 02/12/2025      PREOPERATIVE DIAGNOSIS:  Arthritis, right knee.     POSTOPERATIVE DIAGNOSIS:  Arthritis, right knee.     PROCEDURES PERFORMED:  Robotically-assisted right total knee arthroplasty.     SURGEON:  Elieser Henderson M.D.     ASSISTANT: First assistant: Chelsy Thornton NP , who was necessary and essential for all aspects of the operation, including but no limited to patient positioning, surgical exposure, bony preparation, implantation, wound closure, and dressing placement.       ANESTHESIA: spinal     COMPLICATIONS:  None.     COUNTS:  Correct.     DISPOSITION:  Recovery Room, stable.     SPECIMENS:  Bone and cartilage.     FINDINGS:  Arthritis.      ESTIMATED BLOOD LOSS:  <25ml     IMPLANTS:  [place] Triathlon size 3 right  Triathlon cruciate retaining femoral component and a size 4 primary tibial baseplate,  and a size 4, 10 mm   CS tibial insert.     INDICATIONS FOR PROCEDURE:    Tami Howard is a 79 y.o. year old female    who is   having symptoms of right knee pain.  Physical examination and imaging studies   were consistent with arthritis.Treatment options were explained and it was decided   to proceed with robotically-assisted right total knee arthroplasty.  She was   aware of reasonable treatment options as well as risks and benefits.       PROCEDURE IN DETAIL:  After appropriate consent was obtained, the patient was  brought to the Operating Room, anesthesia was administered.  She received   antibiotic prophylaxis.  A tourniquet was applied to the proximal  right thigh.  right lower extremity was then prepped and draped in usual sterile   fashion.  The leg moser was applied.  Timeout was called.  Limb was elevated   and tourniquet was inflated.  The knee was flexed.  An incision was made from   the tibial tubercle just proximal to the superior pole of the patella.  It was   taken down through the skin and retinaculum.  Skin bleeders were coagulated with cautery. A  medial parapatellar arthrotomy   was performed.   Following this, the anterior cruciate ligament was resected, fat pad   was excised, and medial and lateral meniscal remnants were excised.  Pins were placed in the femur and tibia, followed by assembly and registration of the femoral and tibial arrays.  Hip center and ankle center registration was performed. Femoral and tibial checkpoints were placed and registered. Fine point registration of the femur and tibia was performed, followed by excision of osteophytes and ligament balancing.  When the plan was balanced symmetrically throughout a range of motion, robotic assisted size 3 femoral and size 4 tibial cuts were made.  Posterior oseophytes and loose bodies were excised. A size 4 tibial trial was placed and pinned posteromedially to allow for rotational adjustment and appropriate patella tracking prior to final positional pinning. I then placed a 10 mm trial tibial bearing insert along with a size 3 femoral trial.  The knee was brought into full extension and the preoperative varus deformity was corrected appropriately, relative to the mechanical axis.  Intra-operative motion was 0 degrees to 130 degrees, with excellent medial and lateral stability in mid and deep flexion as well as extension. The patella tracked appropriately and the final tibial rotation was pinned in position.  There was symmetric ligament balancing throughout the range of motion.  The femur and tibial bone preparation was then made for implantation.  Trials were removed and bone surfaces were irrigated, suctioned, and prepared.  I then implanted a size 4 tibial component, followed by pressfit of a 10 mm polyethylene bearing. The size 3 femur was then implanted. There was excellent fixation of all components. Range of motion was 0 degrees to 130 degrees with symmetric ligament balancing and appropriate patella tracking. The knee was irrigated, suctioned, and injected for postoperative pain  control. The arthrotomy was then closed with #1 braided suture, followed by reapproximation of skin edges with 2-0 vicryl suture. Surgical staples were used for skin closure. Sterile dressings were applied. The patient was then taken to recovery room in stable condition.

## 2025-02-12 NOTE — ANESTHESIA PREPROCEDURE EVALUATION
"                                                                                                             2025  Tami Howard is a 79 y.o., female presents office today with a friend for evaluation for right knee pain.  She has a known history of osteoarthritis.  She has had ongoing medial-sided right knee pain for many years but has worsened over the last year.  She recently underwent left total knee arthroplasty in 2024 in [facility].  She developed a DVT postoperatively and was treated with Eliquis.  She is not happy with her progress thus far.  She wanted to seek out a different orthopedist to pursue right total knee arthroplasty.  She has had multiple injections in the past with short term relief.  She has tried many months of physical therapy as well as activity modification.     Height: 5' 7" (1.702 m) (25)   Weight: 90.7 kg (200 lb) (25)   BMI: 31.3 (25)   NPO Status:    Allergies: TYLENOL [ACETAMINOPHEN]     Past Medical History   HTN (hypertension) Mild intermittent asthma, uncomplicated   Sleep apnea, unspecified Arthritis   Restless leg DVT (deep venous thrombosis)   Primary osteoarthritis of right knee      Surgical History    tailbone surgery CHOLECYSTECTOMY   NASAL SINUS SURGERY HYSTERECTOMY   ARTHROSCOPY,KNEE,WITH MENISCUS REPAIR TOTAL KNEE ARTHROPLASTY   SHOULDER ARTHROSCOPY W/ ROTATOR CUFF REPAIR  SECTION   CATARACT EXTRACTION W/  INTRAOCULAR LENS IMPLANT DILATION AND CURETTAGE OF UTERUS   COLONOSCOPY    Substance History    Smoking Status: Never   Smokeless Tobacco Status: Never   Alcohol use: Yes, unspecified volume   Drug use: Never     Prescription Medications  Within last 14 days from 25   Last Taken Last Updated   albuterol (PROVENTIL/VENTOLIN HFA) 90 mcg/actuation inhaler    Past Month 25 1001   hydroCHLOROthiazide (HYDRODIURIL) 25 MG tablet    2/10/2025 at Bedtime 25 1001   ibuprofen (ADVIL,MOTRIN) 200 MG tablet    " 1/15/2025 02/12/25 1001   losartan (COZAAR) 100 MG tablet    2/11/2025 at 10:30 PM 02/12/25 1001   metoprolol succinate (TOPROL-XL) 25 MG 24 hr tablet    2/11/2025 at 10:30 PM 02/12/25 1001   montelukast (SINGULAIR) 10 mg tablet    Past Month 02/12/25 1001   multivitamin (THERAGRAN) per tablet    1/15/2025 02/12/25 1001   rOPINIRole (REQUIP) 1 MG tablet    2/11/2025 at 10:30 PM 02/12/25 1001   sertraline (ZOLOFT) 100 MG tablet    2/11/2025 at 10:30 PM 02/12/25 1001      Latest Reference Range & Units 01/06/25 13:21   WBC 4.50 - 11.50 x10(3)/mcL 7.39   RBC 4.20 - 5.40 x10(6)/mcL 4.03 (L)   Hemoglobin 12.0 - 16.0 g/dL 12.5   Hematocrit 37.0 - 47.0 % 37.2   Platelet Count 130 - 400 x10(3)/mcL 237   Sodium 136 - 145 mmol/L 143   Potassium 3.5 - 5.1 mmol/L 4.1   Chloride 98 - 107 mmol/L 102   CO2 23 - 31 mmol/L 29   Anion Gap mEq/L 12.0   BUN 9.8 - 20.1 mg/dL 14.8   Creatinine 0.55 - 1.02 mg/dL 0.75   BUN/CREAT RATIO  20   eGFR mL/min/1.73/m2 >60   Glucose 82 - 115 mg/dL 130 (H)   Calcium 8.4 - 10.2 mg/dL 9.8   EKG 1/6/25    Sinus rhythm   Abnormal ECG       Pre-op Assessment    I have reviewed the Patient Summary Reports.     I have reviewed the Nursing Notes. I have reviewed the NPO Status.   I have reviewed the Medications.     Review of Systems  Anesthesia Hx:  No problems with previous Anesthesia   Neg history of prior surgery.          Denies Family Hx of Anesthesia complications.    Denies Personal Hx of Anesthesia complications.                    Social:  Non-Smoker, Social Alcohol Use       Hematology/Oncology:    Oncology Normal    -- Denies Anemia:                                  EENT/Dental:  EENT/Dental Normal           Cardiovascular:  Exercise tolerance: good  Pacemaker: took bp meds last night. Hypertension, well controlled       Denies Angina.        ECG has been reviewed.                      Hypertension         Pulmonary:    Asthma asymptomatic and mild  Denies Shortness of breath.  Sleep Apnea    Asthma:    Obstructive Sleep Apnea (RONEY).      Education provided regarding risk of obstructive sleep apnea            Renal/:   Denies Chronic Renal Disease.                Hepatic/GI:      Denies GERD.    Not Taking GLP-1 Agonists            Musculoskeletal:  Arthritis        Arthritis          Neurological:    Denies CVA.            Arthritis                           Endocrine:  Denies Diabetes.         Denies Obesity / BMI > 30  Dermatological:  Skin Normal    Psych:  Psychiatric Normal                    Physical Exam  General: Well nourished, Cooperative, Alert and Oriented    Airway:  Mallampati: I   Mouth Opening: Normal  TM Distance: Normal  Tongue: Normal  Neck ROM: Normal ROM    Dental:  Intact    Chest/Lungs:  Clear to auscultation, Normal Respiratory Rate    Heart:  Rate: Normal  Rhythm: Regular Rhythm  Sounds: Normal    Abdomen:  Soft, Nontender, Distention        Anesthesia Plan  Type of Anesthesia, risks & benefits discussed:    Anesthesia Type: Gen Natural Airway, Spinal, Regional  Intra-op Monitoring Plan: Standard ASA Monitors  Post Op Pain Control Plan: multimodal analgesia and peripheral nerve block  Induction:  IV  Informed Consent: Informed consent signed with the Patient and all parties understand the risks and agree with anesthesia plan.  All questions answered. Patient consented to blood products? No  ASA Score: 3  Day of Surgery Review of History & Physical: H&P Update referred to the surgeon/provider.    Ready For Surgery From Anesthesia Perspective.     .

## 2025-02-12 NOTE — PLAN OF CARE
Problem: Adult Inpatient Plan of Care  Goal: Plan of Care Review  Outcome: Progressing  Goal: Patient-Specific Goal (Individualized)  Outcome: Progressing  Goal: Absence of Hospital-Acquired Illness or Injury  Outcome: Progressing  Goal: Optimal Comfort and Wellbeing  Outcome: Progressing  Goal: Readiness for Transition of Care  Outcome: Progressing     Problem: Wound  Goal: Optimal Coping  Outcome: Progressing  Goal: Optimal Functional Ability  Outcome: Progressing  Goal: Absence of Infection Signs and Symptoms  Outcome: Progressing  Goal: Improved Oral Intake  Outcome: Progressing  Goal: Optimal Pain Control and Function  Outcome: Progressing  Goal: Skin Health and Integrity  Outcome: Progressing  Goal: Optimal Wound Healing  Outcome: Progressing     Problem: Infection  Goal: Absence of Infection Signs and Symptoms  Outcome: Progressing     Problem: Knee Arthroplasty  Goal: Optimal Coping  Outcome: Progressing  Goal: Absence of Bleeding  Outcome: Progressing  Goal: Effective Bowel Elimination  Outcome: Progressing  Goal: Fluid and Electrolyte Balance  Outcome: Progressing  Goal: Optimal Functional Ability  Outcome: Progressing  Goal: Absence of Infection Signs and Symptoms  Outcome: Progressing  Goal: Intact Neurovascular Status  Outcome: Progressing  Goal: Anesthesia/Sedation Recovery  Outcome: Progressing  Goal: Optimal Pain Control and Function  Outcome: Progressing  Goal: Nausea and Vomiting Relief  Outcome: Progressing  Goal: Effective Urinary Elimination  Outcome: Progressing     Problem: Knee Arthroplasty  Goal: Effective Oxygenation and Ventilation  Reactivated     Problem: Comorbidity Management  Goal: Maintenance of Asthma Control  Reactivated  Goal: Blood Pressure in Desired Range  Reactivated

## 2025-02-12 NOTE — ANESTHESIA POSTPROCEDURE EVALUATION
Anesthesia Post Evaluation    Patient: Tami Howard    Procedure(s) Performed: Procedure(s) (LRB):  ROBOTIC ARTHROPLASTY, KNEE, TOTAL (Right)    Final Anesthesia Type: general  The surgery is a total joint replacement and the reasoning for not using regional anesthesia is (Spinal was placed in the OR without difficulty, but patient was still moving at the time of incision)    Patient location during evaluation: PACU  Patient participation: Yes- Able to Participate  Level of consciousness: awake and alert and oriented  Post-procedure vital signs: reviewed and stable  Pain management: adequate  Airway patency: patent  RONEY mitigation strategies: Use of major conduction anesthesia (spinal/epidural) or peripheral nerve block  PONV status at discharge: No PONV  Anesthetic complications: no      Cardiovascular status: hemodynamically stable  Respiratory status: unassisted, spontaneous ventilation and room air  Hydration status: euvolemic  Follow-up not needed.              Vitals Value Taken Time   BP 95/53 02/12/25 1522   Temp 36 °C (96.8 °F) 02/12/25 1404   Pulse 63 02/12/25 1522   Resp 19 02/12/25 1444   SpO2 100 % 02/12/25 1522   Vitals shown include unfiled device data.      Event Time   Out of Recovery 14:45:00         Pain/Eleni Score: Pain Rating Prior to Med Admin: 10 (2/12/2025 10:31 AM)  Eleni Score: 9 (2/12/2025  2:42 PM)

## 2025-02-12 NOTE — H&P
Past Medical History:   Diagnosis Date    Arthritis     HTN (hypertension)     Mild intermittent asthma, uncomplicated     Restless leg     Sleep apnea, unspecified     does not use machine       Past Surgical History:   Procedure Laterality Date    ARTHROSCOPY,KNEE,WITH MENISCUS REPAIR Left     CATARACT EXTRACTION W/  INTRAOCULAR LENS IMPLANT Bilateral 2014     SECTION      And     CHOLECYSTECTOMY      COLONOSCOPY      DILATION AND CURETTAGE OF UTERUS      x 6 for missed AB    HYSTERECTOMY      NASAL SINUS SURGERY      SHOULDER ARTHROSCOPY W/ ROTATOR CUFF REPAIR Left     tailbone surgery      TOTAL KNEE ARTHROPLASTY Left        No current facility-administered medications for this encounter.     Current Outpatient Medications   Medication Sig    hydroCHLOROthiazide (HYDRODIURIL) 25 MG tablet Take 25 mg by mouth nightly.    ibuprofen (ADVIL,MOTRIN) 200 MG tablet Take 200 mg by mouth every 6 (six) hours as needed for Pain.    losartan (COZAAR) 100 MG tablet Take 100 mg by mouth nightly.    metoprolol succinate (TOPROL-XL) 25 MG 24 hr tablet Take 25 mg by mouth nightly.    montelukast (SINGULAIR) 10 mg tablet Take 10 mg by mouth as needed.    multivitamin (THERAGRAN) per tablet Take 1 tablet by mouth once daily.    rOPINIRole (REQUIP) 1 MG tablet Take 3 mg by mouth every evening.    sertraline (ZOLOFT) 100 MG tablet Take 200 mg by mouth every evening.    albuterol (PROVENTIL/VENTOLIN HFA) 90 mcg/actuation inhaler Inhale 1 puff into the lungs every 6 (six) hours as needed.       Review of patient's allergies indicates:   Allergen Reactions    Tylenol [acetaminophen]        Family History   Problem Relation Name Age of Onset    Asthma Mother Thomaskvng Saini Tonny     Miscarriages / Stillbirths Father Jayshree Lancaster     Depression Sister  Terri Ludwig     Hypertension Brother Warren Tonny        Social History     Socioeconomic History    Marital status:    Tobacco Use    Smoking status: Never     Smokeless tobacco: Never   Substance and Sexual Activity    Alcohol use: Yes     Comment: ocas    Drug use: Never    Sexual activity: Yes     Birth control/protection: Condom       Chief Complaint: No chief complaint on file.      History of present illness:  79 year female with advanced osteoarthrosis of the right knee who is here for elective robotic assisted right total knee arthroplasty today.  She was previously scheduled the week of the snow storm and has been rescheduled for today.  No changes in health since that time.      Review of Systems:    Constitution: Negative for chills, fever, and sweats.  Negative for unexplained weight loss.    HENT:  Negative for headaches and blurry vision.    Cardiovascular:Negative for chest pain or irregular heart beat. Negative for hypertension.    Respiratory:  Negative for cough and shortness of breath.    Gastrointestinal: Negative for abdominal pain, heartburn, melena, nausea, and vomitting.    Genitourinary:  Negative bladder incontinence and dysuria.    Musculoskeletal:  See HPI    Neurological: Negative for numbness.    Psychiatric/Behavioral: Negative for depression.  The patient is not nervous/anxious.      Endocrine: Negative for polyuria    Hematologic/Lymphatic: Negative for bleeding problem.  Does not bruise/bleed easily.    Skin: Negative for poor would healing and rash      Physical Examination:    Vital Signs:  There were no vitals filed for this visit.    Body mass index is 31.47 kg/m².    This a well-developed, well nourished patient in no acute distress.  They are alert and oriented and cooperative to examination.  Pt. walks with an antalgic gait.      Physical exam  General exam:  Well groomed, well nourished, no acute distress  Alert and oriented x3  HEENT:  Pupils are equal, round, and reactive to light, normocephalic, atraumatic  Cardiovascular:  S1 and S2 heard, no murmurs  Pulmonary:  Lungs clear to auscultation bilaterally  Gastrointestinal:   Positive bowel sounds, soft, nontender, nondistended        General Musculoskeletal Exam   Gait: antalgic       Right Knee Exam     Inspection   Erythema: absent  Effusion: present  Deformity: present  Bruising: absent    Tenderness   The patient is tender to palpation of the medial joint line, MCL, condyle and medial retinaculum.    Crepitus   The patient has crepitus of the medial joint line.    Range of Motion   Extension: abnormal   Flexion: abnormal   5° to 125°    Tests   Meniscus   Felipe:  Medial - positive Lateral - negative  Ligament Examination   MCL - Valgus: normal (0 to 2mm)  LCL - Varus: normal  Patella   Passive Patellar Tilt: neutral    Other   Sensation: normal    Comments:  Varus deformity    Muscle Strength   Right Lower Extremity   Quadriceps:  5/5   Hamstrin/5     Vascular Exam     Right Pulses  Dorsalis Pedis:      2+  Posterior Tibial:      2+          X-rays:  Four views of the right knee which show that she is bone-on-bone with grade 4 changes and sclerosis in the medial compartment of the right knee.  Tricompartmental osteophytes.  Varus deformity.  Impression: Advanced osteoarthrosis right knee.     Assessment::  Advanced osteoarthrosis right knee    Plan:  Robotic assisted right total knee arthroplasty    Risks, benefits, alternatives, and complications were explained to the patient and/or patient representative. They understand, agree, and want to proceed with the operation/ procedure.      This note was created using [employer] voice recognition software that occasionally misinterpreted phrases or words.    Consult note is delivered via Epic messaging service.

## 2025-02-13 LAB
ANION GAP SERPL CALC-SCNC: 5 MEQ/L
BUN SERPL-MCNC: 16.5 MG/DL (ref 9.8–20.1)
CALCIUM SERPL-MCNC: 8.1 MG/DL (ref 8.4–10.2)
CHLORIDE SERPL-SCNC: 106 MMOL/L (ref 98–107)
CO2 SERPL-SCNC: 28 MMOL/L (ref 23–31)
CREAT SERPL-MCNC: 0.86 MG/DL (ref 0.55–1.02)
CREAT/UREA NIT SERPL: 19
ERYTHROCYTE [DISTWIDTH] IN BLOOD BY AUTOMATED COUNT: 13.6 % (ref 11.5–17)
GFR SERPLBLD CREATININE-BSD FMLA CKD-EPI: >60 ML/MIN/1.73/M2
GLUCOSE SERPL-MCNC: 113 MG/DL (ref 82–115)
HCT VFR BLD AUTO: 33.1 % (ref 37–47)
HGB BLD-MCNC: 10.7 G/DL (ref 12–16)
MCH RBC QN AUTO: 31.1 PG (ref 27–31)
MCHC RBC AUTO-ENTMCNC: 32.3 G/DL (ref 33–36)
MCV RBC AUTO: 96.2 FL (ref 80–94)
NRBC BLD AUTO-RTO: 0 %
PLATELET # BLD AUTO: 162 X10(3)/MCL (ref 130–400)
PMV BLD AUTO: 10.4 FL (ref 7.4–10.4)
POCT GLUCOSE: 103 MG/DL (ref 70–110)
POCT GLUCOSE: 107 MG/DL (ref 70–110)
POCT GLUCOSE: 112 MG/DL (ref 70–110)
POCT GLUCOSE: 118 MG/DL (ref 70–110)
POTASSIUM SERPL-SCNC: 3.7 MMOL/L (ref 3.5–5.1)
RBC # BLD AUTO: 3.44 X10(6)/MCL (ref 4.2–5.4)
SODIUM SERPL-SCNC: 139 MMOL/L (ref 136–145)
WBC # BLD AUTO: 6.09 X10(3)/MCL (ref 4.5–11.5)

## 2025-02-13 PROCEDURE — 25000003 PHARM REV CODE 250: Performed by: NURSE PRACTITIONER

## 2025-02-13 PROCEDURE — 97116 GAIT TRAINING THERAPY: CPT

## 2025-02-13 PROCEDURE — 85027 COMPLETE CBC AUTOMATED: CPT | Performed by: SPECIALIST

## 2025-02-13 PROCEDURE — 27000221 HC OXYGEN, UP TO 24 HOURS

## 2025-02-13 PROCEDURE — 97166 OT EVAL MOD COMPLEX 45 MIN: CPT

## 2025-02-13 PROCEDURE — 80048 BASIC METABOLIC PNL TOTAL CA: CPT | Performed by: SPECIALIST

## 2025-02-13 PROCEDURE — 63600175 PHARM REV CODE 636 W HCPCS: Performed by: SPECIALIST

## 2025-02-13 PROCEDURE — 11000001 HC ACUTE MED/SURG PRIVATE ROOM

## 2025-02-13 PROCEDURE — 94761 N-INVAS EAR/PLS OXIMETRY MLT: CPT

## 2025-02-13 PROCEDURE — 97535 SELF CARE MNGMENT TRAINING: CPT

## 2025-02-13 PROCEDURE — 25000003 PHARM REV CODE 250: Performed by: SPECIALIST

## 2025-02-13 PROCEDURE — 94799 UNLISTED PULMONARY SVC/PX: CPT

## 2025-02-13 PROCEDURE — 36415 COLL VENOUS BLD VENIPUNCTURE: CPT | Performed by: SPECIALIST

## 2025-02-13 PROCEDURE — 99900031 HC PATIENT EDUCATION (STAT)

## 2025-02-13 PROCEDURE — 97110 THERAPEUTIC EXERCISES: CPT

## 2025-02-13 RX ORDER — POLYETHYLENE GLYCOL 3350 17 G/17G
17 POWDER, FOR SOLUTION ORAL DAILY
Start: 2025-02-13 | End: 2025-02-27

## 2025-02-13 RX ORDER — ACETAMINOPHEN 500 MG
500 TABLET ORAL
Status: DISCONTINUED | OUTPATIENT
Start: 2025-02-13 | End: 2025-02-14

## 2025-02-13 RX ORDER — METHOCARBAMOL 500 MG/1
500 TABLET, FILM COATED ORAL EVERY 8 HOURS PRN
Status: DISCONTINUED | OUTPATIENT
Start: 2025-02-13 | End: 2025-02-15 | Stop reason: HOSPADM

## 2025-02-13 RX ORDER — TRAMADOL HYDROCHLORIDE 50 MG/1
50 TABLET ORAL EVERY 4 HOURS PRN
Qty: 42 TABLET | Refills: 0 | Status: SHIPPED | OUTPATIENT
Start: 2025-02-13 | End: 2025-02-20

## 2025-02-13 RX ORDER — METHOCARBAMOL 500 MG/1
500 TABLET, FILM COATED ORAL EVERY 8 HOURS PRN
Qty: 42 TABLET | Refills: 0 | Status: SHIPPED | OUTPATIENT
Start: 2025-02-13 | End: 2025-02-27

## 2025-02-13 RX ORDER — TRAMADOL HYDROCHLORIDE 50 MG/1
50 TABLET ORAL EVERY 4 HOURS PRN
Status: DISCONTINUED | OUTPATIENT
Start: 2025-02-13 | End: 2025-02-14

## 2025-02-13 RX ADMIN — SENNOSIDES AND DOCUSATE SODIUM 2 TABLET: 50; 8.6 TABLET ORAL at 08:02

## 2025-02-13 RX ADMIN — METOCLOPRAMIDE HYDROCHLORIDE 10 MG: 5 INJECTION INTRAMUSCULAR; INTRAVENOUS at 08:02

## 2025-02-13 RX ADMIN — ACETAMINOPHEN 500 MG: 500 TABLET ORAL at 02:02

## 2025-02-13 RX ADMIN — LOSARTAN POTASSIUM 100 MG: 50 TABLET, FILM COATED ORAL at 08:02

## 2025-02-13 RX ADMIN — SERTRALINE HYDROCHLORIDE 100 MG: 50 TABLET ORAL at 08:02

## 2025-02-13 RX ADMIN — CEFAZOLIN 2 G: 2 INJECTION, POWDER, FOR SOLUTION INTRAMUSCULAR; INTRAVENOUS at 12:02

## 2025-02-13 RX ADMIN — KETOROLAC TROMETHAMINE 10 MG: 10 TABLET, FILM COATED ORAL at 10:02

## 2025-02-13 RX ADMIN — METOCLOPRAMIDE HYDROCHLORIDE 10 MG: 5 INJECTION INTRAMUSCULAR; INTRAVENOUS at 02:02

## 2025-02-13 RX ADMIN — KETOROLAC TROMETHAMINE 10 MG: 10 TABLET, FILM COATED ORAL at 09:02

## 2025-02-13 RX ADMIN — ACETAMINOPHEN 500 MG: 500 TABLET ORAL at 10:02

## 2025-02-13 RX ADMIN — POLYETHYLENE GLYCOL 3350 17 G: 17 POWDER, FOR SOLUTION ORAL at 08:02

## 2025-02-13 RX ADMIN — APIXABAN 2.5 MG: 2.5 TABLET, FILM COATED ORAL at 08:02

## 2025-02-13 RX ADMIN — CEFAZOLIN 2 G: 2 INJECTION, POWDER, FOR SOLUTION INTRAMUSCULAR; INTRAVENOUS at 06:02

## 2025-02-13 RX ADMIN — FAMOTIDINE 20 MG: 20 TABLET, FILM COATED ORAL at 08:02

## 2025-02-13 RX ADMIN — DOCUSATE SODIUM 200 MG: 100 CAPSULE, LIQUID FILLED ORAL at 06:02

## 2025-02-13 RX ADMIN — KETOROLAC TROMETHAMINE 10 MG: 10 TABLET, FILM COATED ORAL at 04:02

## 2025-02-13 RX ADMIN — ACETAMINOPHEN 500 MG: 500 TABLET ORAL at 09:02

## 2025-02-13 RX ADMIN — HYDROCHLOROTHIAZIDE 25 MG: 25 TABLET ORAL at 08:02

## 2025-02-13 RX ADMIN — METOCLOPRAMIDE HYDROCHLORIDE 10 MG: 5 INJECTION INTRAMUSCULAR; INTRAVENOUS at 03:02

## 2025-02-13 RX ADMIN — METOPROLOL SUCCINATE 25 MG: 25 TABLET, EXTENDED RELEASE ORAL at 08:02

## 2025-02-13 RX ADMIN — ACETAMINOPHEN 500 MG: 500 TABLET ORAL at 05:02

## 2025-02-13 NOTE — PROGRESS NOTES
"  The patient is status post right total knee arthroplasty postoperative day number 1  Resting in bed, pain controlled, family at bedside  Patient did not get up with therapy yesterday as she was groggy in hypotensive from anesthesia.  She is doing better    Vital Signs  Temp: 97.2 °F (36.2 °C)  Temp Source: Oral  Pulse: 61  Heart Rate Source: Monitor  Resp: 18  SpO2: (!) 94 %  Pulse Oximetry Type: Continuous  Flow (L/min) (Oxygen Therapy): 4  Oxygen Concentration (%): 80  Device (Oxygen Therapy): other (comment) (OXYMASK)  BP: (!) 94/57  BP Location: Right arm  BP Method: Automatic  Patient Position: Lying  Arousal Level: opens eyes spontaneously  Height and Weight  Height: 5' 7" (170.2 cm)  Height Method: Stated  Weight: 90.7 kg (200 lb)  Weight Method: Standard Scale  BSA (Calculated - sq m): 2.07 sq meters  BMI (Calculated): 31.3  Weight in (lb) to have BMI = 25: 159.3]    +FHL/EHL  BCR distally  Dressing c/d/I  Thigh and calf compartments soft and compressible  SILT distally    Recent Lab Results  (Last 5 results in the past 24 hours)        02/13/25  0610   02/13/25  0408   02/12/25  1945   02/12/25  1720   02/12/25  1703        Anion Gap   5.0             BUN   16.5             BUN/CREAT RATIO   19             Calcium   8.1             Chloride   106             CO2   28             Creatinine   0.86             eGFR   >60  Comment: Estimated GFR calculated using the CKD-EPI creatinine (2021) equation.             Glucose   113             Hematocrit   33.1             Hemoglobin   10.7             MCH   31.1             MCHC   32.3             MCV   96.2             MPV   10.4             nRBC   0.0             Platelet Count   162             POC BE         5       POC HCO3         30.1       POC PCO2         51.0       POC PH         7.379       POC PO2         69       POC SATURATED O2         93       POC TCO2         32       POCT Glucose 118     121   83         Potassium   3.7             RBC   3.44   "           RDW   13.6             Sample         ARTERIAL       Sodium   139             WBC   6.09                                    A/P:  Status post right total knee arthroplasty postop day 1.  Orthopedically stable  Continue with postoperative care, PT/OT  Eliquis for DVT Ppx  Case management consulted for placement   Orthopedics will follow

## 2025-02-13 NOTE — DISCHARGE INSTRUCTIONS
[facility]  [facility] [facility] [address]  [place] [address]  Phone [phone]       /      Fax [phone]  SURGEON: Dr. Henderson    After discharge, all questions or concerns should be handled at your surgeon's office ([phone]). If it is a weekend or after hours, you will get the surgeon on call.     Discharge Medications:    PAIN MANAGEMENT: Next Dose Available   Tylenol/Acetaminophen 500mg- every 4 hours as needed If needed   Ultram/Tramadol 50mg (Pain Med) - every 4-6 hours AS NEEDED for pain  Robaxin 500mg (muscle relaxer)- every 6-8 hours if needed for muscle spasms If needed   COMPLICATION PREVENTION MEDS: Next Dose DUE   Eliquis 2.5mg twice a day for 30 days post-op for continued blood clot prevention PM on 2/15/2025   Miralax 17gm or Senokot S/Lurdes-Colace 8.6/50mg 2 tablets - once or twice a day while on narcotics and muscle relaxers for constipation prevention PM on 2/15/2025   NOZIN NASAL  - twice a day for 2 weeks or until supply runs out, whichever comes first (Infection prevention) PM on 2/15/2025      Total Knee Replacement                                                                                                                                   PAIN MEDICATIONS/PAIN MANAGEMENT: (Use the medication log in your discharge packet to keep track of your medications)  While on Eliquis, no anti-inflammatories (Ibuprofen, Aleve, Motrin, Naproxen, Mobic/Meloxicam, Toradol/Ketorolac, Celebrex, Diclofenac/Voltaren...etc) for 2 weeks or until the wound is healed.    Tylenol/Acetaminophen 500mg every 4 hours as needed for pain  Take Ultram (Tramadol) 50mg (pain pill) every 4-6 hours as needed for BREAKTHROUGH pain.    **NO MORE THAN 3000mg OF TYLENOL IN 24 HOURS**.     Robaxin/Methocarbamol 500mg (muscle relaxer)- you can take every 6-8 hours as needed for muscle spasms, thigh pain and stiffness, additional pain control or breakthrough pain medications. This  medication is helpful for pain control while lessening your need for narcotics. Please reduce the use gradually as the pain and spasms lessen. DO NOT TAKE AT THE SAME TIME AS A PAIN PILL. YOU WILL BE BETTER SERVED WITH 2 HOURS BETWEEN PAIN PILL AND MUSCLE RELAXER.     **Other things that help with pain control is WALKING, COMPRESSION WRAP, ICE and ELEVATION!!**    BLOOD CLOT PREVENTION:     Eliquis 2.5 mg twice a day for 30 days postop. Start on the evening of 2/15/2025. Complete prescription (3/16/25)  You need to continuing wearing your compression stockings (HERMILO Hose - ThromboEmbolic Disease Prevention Device) for the next 2-6 weeks post-op. It is ok to remove them for hygiene and at bedtime.   Hand wash and Dry. **If the swelling persists in the legs after you stop wearing the HERMILO hose, continue to wear them until the swelling decreases.**  REMOVE STOCKINGS AT LEAST DAILY FOR SKIN ASSESSMENT.   Do NOT let the stockings roll down, creating a tourniquet around the back of your knee or ankle. If you need to, leave the excess at the bottom of the stocking.   The best thing you can do to prevent blood clots is to walk around as much as possible, AT LEAST EVERY 1-2 HOURS.       CONSTIPATION PREVENTION:   Miralax or Senokot S/Lurdes-Colace and Stool softeners EVERY DAY while on pain meds.  Use other more aggressive over the counter LAXATIVES as needed for constipation (Examples: Milk of Magnesia, Dulcolax tabs or suppository, Magnesium Citrate, Fleet's Enema...etc.)   Drink lots of water.  Increase Fiber in diet.  Increase walking distance each day  DO NOT GO MORE THAN 2 DAYS WITHOUT HAVING A BOWEL MOVEMENT!    ACTIVITY:   Weight bearing precautions as follows:  FULL weight bearing to operative leg with walker.   DO NOT TAKE YOURSELF OFF OF THE WALKER TOO SOON. ALLOW YOUR OUTPATIENT THERAPIST or SURGEON TO GUIDE YOU.   Range of motion as tolerated. Work on BENDING and STRAIGHTENING your knee. Change positions often  throughout the day. DO NOT PUT ANYTHING BEHIND THE KNEE, KEEPING IT IN A BENT POSITION.   Walk around at least every 1-2 hours while awake.   No heavy lifting, pulling, pushing or straining. Take it easy!  Home Health Physical Therapy at least 3 times per week. After 2 weeks, transition to outpatient physical therapy.      SWELLING PREVENTION AND TREATMENT:  Elevate affected extremity way ABOVE THE LEVEL OF THE HEART to reduce swelling (15-30 minutes at a time, 3 times a day).  Ice the Knee, thigh and lower leg AS MUCH AS POSSIBLE  Compression stockings and ace wrap around the knee and thigh as much as possible. Ok to remove at night for comfort.     WOUND CARE:   Operative Knee Incision - Grey Mepilex 7-day bandage- Do NOT REMOVE until change date 2/19/25, unless soiled. NO ointments, creams, lotions or antiseptics on the incision. Once removed on the change date, apply occlusive coverlet dressing (long white bandage) and change every other day and as needed for soiling for the next 7 days or until you see your surgeon.     DO NOT TOUCH INCISION(s). DO NOT WET THE INCISION(s). DO NOT apply any ointments, creams, lotions or antiseptics to the incision(s).   Ace wrap - apply your compression stocking to the lower leg and apply the ace wrap where the stocking stops for extra added compression to the knee and thigh.   May wet incision AFTER 2 weeks- (after you follow-up with your surgeon). Ok to shower before then if able to keep wound from getting wet (plastic barrier, saran wrap or cling wrap and tape).       URINARY RETENTION:  If you start having difficulty urinating, decrease the use of Pain pills and muscle relaxers and notify your primary care doctor.     PNEUMONIA PREVENTION:  Stay out of bed as much as possible and walk around every 1-2 hours.  Continue breathing exercises (Incentive Spirometry) every 1-2 hours while mobility is limited and while you are on pain pills.    FALL PREVENTION:  Wear sturdy shoes  that fit well - Wearing shoes with high heels or slippery soles, or shoes that are too loose, can lead to falls. Walking around in bare feet, or only socks, can also increase your risk of falling.  Use walker as long as your surgeon and therapist recommend it  Use good lighting and  throw rugs, electrical cords, furniture and clutter (anything than can cause you to trip at home.   Non-slip rug in bathroom or shower      INFECTION PREVENTION:  NOZIN ANTISEPTIC NASAL  - twice a day for 2 weeks or until supply runs out, whichever comes first. Shake bottle well, saturate cotton swab with 4 drops of antiseptic solution. Swab right nostril rim 6-8 times clockwise and counterclockwise. Take swab out, apply 2 more drops then swab left nostril rim 6-8 times clockwise and counterclockwise.   Proper handwashing before and after dressing changes. Do not wet the wound. Wound care instructions as written above. NOTIFY MD OF EXCESSIVE WOUND DRAINAGE.  No alcohol, smoking or tobacco products  Pets should not be allowed around the wound or the dressing.   Treat UTI and skin infections as soon as possible.  Pre-medicate with antibiotics prior to dental or surgical procedures.   If you are diabetic, MAINTAIN GOOD BLOOD SUGAR CONTROL (Below 150) DURING YOUR RECOVERY. If you see high numbers, notify your primary care doctor.     Call your SURGEON'S OFFICE ([phone]) if you experience the following signs and symptoms of infection:   Unusual redness, swelling, excessive, cloudy or foul smelling drainage at the incision site.   Persistent low grade temp OR a temp greater than 102 F, unrelieved by Tylenol  Pain at surgical site, unrelieved by pain meds    Warning signs of a blood clot in your leg: (CALL YOUR SURGEON)  New onset or increasing pain in calf, new onset tenderness or redness above or below the knee or increasing swelling of your calf, ankle, or foot.  Warning signs that a blood clot has traveled to your lungs:  (REPORT TO THE ER/CALL [phone])  Sudden or increase in Shortness of breath, sudden onset of chest pains, or  Localized chest pain with coughing.       IF ANY ISSUES ARISE AND YOU FEEL THE NEED TO CALL YOUR PRIMARY CARE DOCTOR, PLEASE LET YOUR SURGEON KNOW AS WELL.     For emergencies, please report to OUR ([facility] or [facility] [place]) Emergency department and tell them to call YOUR SURGEON at [phone].     BEFORE MAKING ANY CHANGES TO THE MEDICAL CARE PLAN OR GOING TO THE EMERGENCY ROOM, PLEASE CONTACT THE SURGEON.      After discharge, all questions or concerns should be handled at your surgeon's office ([phone]). If it is a weekend or after hours, you will get the surgeon on call.     Dayami Davenport RN, nurse navigator, available for questions or issues after discharge at [phone].

## 2025-02-13 NOTE — PLAN OF CARE
Problem: Occupational Therapy  Goal: Occupational Therapy Goal  Description: Pt will perform LB dressing standby with AE PRN by d/c.  Pt will perform toileting standby A by d/c.  Pt will perform toilet t/f standby A with LRAD by d/c.  Pt will perform tub t/f standby assist c TTB by d/c.  Outcome: Progressing

## 2025-02-13 NOTE — PLAN OF CARE
Problem: Adult Inpatient Plan of Care  Goal: Plan of Care Review  Outcome: Progressing  Goal: Patient-Specific Goal (Individualized)  Outcome: Progressing  Goal: Absence of Hospital-Acquired Illness or Injury  Outcome: Progressing  Goal: Optimal Comfort and Wellbeing  Outcome: Progressing  Goal: Readiness for Transition of Care  Outcome: Progressing     Problem: Wound  Goal: Optimal Coping  Outcome: Progressing  Goal: Optimal Functional Ability  Outcome: Progressing  Goal: Absence of Infection Signs and Symptoms  Outcome: Progressing  Goal: Improved Oral Intake  Outcome: Progressing  Goal: Optimal Pain Control and Function  Outcome: Progressing  Goal: Skin Health and Integrity  Outcome: Progressing  Goal: Optimal Wound Healing  Outcome: Progressing     Problem: Infection  Goal: Absence of Infection Signs and Symptoms  Outcome: Progressing     Problem: Knee Arthroplasty  Goal: Optimal Coping  Outcome: Progressing  Goal: Absence of Bleeding  Outcome: Progressing  Goal: Effective Bowel Elimination  Outcome: Progressing  Goal: Fluid and Electrolyte Balance  Outcome: Progressing  Goal: Optimal Functional Ability  Outcome: Progressing  Goal: Absence of Infection Signs and Symptoms  Outcome: Progressing  Goal: Intact Neurovascular Status  Outcome: Progressing  Goal: Anesthesia/Sedation Recovery  Outcome: Progressing  Goal: Optimal Pain Control and Function  Outcome: Progressing  Goal: Nausea and Vomiting Relief  Outcome: Progressing  Goal: Effective Urinary Elimination  Outcome: Progressing  Goal: Effective Oxygenation and Ventilation  Outcome: Progressing     Problem: Comorbidity Management  Goal: Maintenance of Asthma Control  Outcome: Progressing  Goal: Blood Pressure in Desired Range  Outcome: Progressing     Problem: Skin Injury Risk Increased  Goal: Skin Health and Integrity  Reactivated

## 2025-02-13 NOTE — PLAN OF CARE
Problem: Physical Therapy  Goal: Physical Therapy Goal  Description: Pt will improve functional independence by performing:    Bed mobility: SBA  Sit to stand: SBA  Car Transfer: Min A  with rolling walker  Ambulation x 150' feet with SBA and rolling walker  1 Step (Curb): Min A  and rolling walker  3 Steps: Min A  and L HR  right knee AROM flexion (in degrees): 90  right knee AROM extension (in degrees): 0   Independent with total knee HEP     Outcome: Progressing

## 2025-02-13 NOTE — PLAN OF CARE
02/13/25 1410   Discharge Assessment   Assessment Type Discharge Planning Assessment   Source of Information patient   Does patient/caregiver understand observation status Yes   Communicated GUY with patient/caregiver Yes   Reason For Admission s/p TKR   People in Home alone   Do you expect to return to your current living situation? Yes   Do you have help at home or someone to help you manage your care at home? No   Prior to hospitilization cognitive status: Alert/Oriented   Current cognitive status: Alert/Oriented   Walking or Climbing Stairs Difficulty yes   Walking or Climbing Stairs ambulation difficulty, requires equipment;ambulation difficulty, assistance 1 person   Dressing/Bathing Difficulty yes   Dressing/Bathing bathing difficulty, requires equipment;bathing difficulty, assistance 1 person;dressing difficulty, requires equipment;dressing difficulty, assistance 1 person   Equipment Currently Used at Home rollator   Readmission within 30 days? No   Patient currently being followed by outpatient case management? No   Do you currently have service(s) that help you manage your care at home? No   Do you take prescription medications? Yes   Do you have prescription coverage? Yes   Do you have any problems affording any of your prescribed medications? No   Is the patient taking medications as prescribed? yes   Who is going to help you get home at discharge? MCKENNA GODOY [phone]   How do you get to doctors appointments? car, drives self   Are you on dialysis? No   Do you take coumadin? No   Discharge Plan A Rehab   Discharge Plan B Skilled Nursing Facility   DME Needed Upon Discharge  walker, rolling   Discharge Plan discussed with: Patient   Transition of Care Barriers Mobility   Physical Activity   On average, how many days per week do you engage in moderate to strenuous exercise (like a brisk walk)? 0 days   On average, how many minutes do you engage in exercise at this level? 0 min   Financial Resource  Strain   How hard is it for you to pay for the very basics like food, housing, medical care, and heating? Not hard   Housing Stability   In the last 12 months, was there a time when you were not able to pay the mortgage or rent on time? N   At any time in the past 12 months, were you homeless or living in a shelter (including now)? N   Transportation Needs   Has the lack of transportation kept you from medical appointments, meetings, work or from getting things needed for daily living? No   Food Insecurity   Within the past 12 months, you worried that your food would run out before you got the money to buy more. Never true   Within the past 12 months, the food you bought just didn't last and you didn't have money to get more. Never true   Stress   Do you feel stress - tense, restless, nervous, or anxious, or unable to sleep at night because your mind is troubled all the time - these days? Only a littl   Social Isolation   How often do you feel lonely or isolated from those around you?  Rarely   Alcohol Use   Q1: How often do you have a drink containing alcohol? Pt Declined   Q2: How many drinks containing alcohol do you have on a typical day when you are drinking? Pt Declined   Q3: How often do you have six or more drinks on one occasion? Pt Declined   Utilities   In the past 12 months has the electric, gas, oil, or water company threatened to shut off services in your home? No   Health Literacy   How often do you need to have someone help you when you read instructions, pamphlets, or other written material from your doctor or pharmacy? Never     S/p TKR. Spk w pt preop & postop re: dcp. Pt lives alone. Son- Kelechi checks on her & runs errands. Pt has rolator, but needs RW. Std she has no friends or family to asst w homecare. Discuss dcp options. Pt requesting [facility] or Rehab at [facility]. Foc obtained.     Called/ faxed to [facility]. Await decision.     Contact Amanda Kelechi  [phone]    Pcp: Dr Irwin.

## 2025-02-13 NOTE — PT/OT/SLP EVAL
Physical Therapy Evaluation    Patient Name:  Tami Howard   MRN:  [id number]    Recommendations:     Discharge Recommendations: Low Intensity Therapy   Discharge Equipment Recommendations: none   Barriers to discharge: None    Assessment:     Tami Howard is a 79 y.o. female admitted with a medical diagnosis of Primary osteoarthritis of right knee.  She presents with the following impairments/functional limitations: weakness, impaired endurance, gait instability, impaired functional mobility, impaired self care skills, impaired balance, decreased safety awareness . Limited tonight due to her knee buckling    Rehab Prognosis: Good; patient would benefit from acute skilled PT services to address these deficits and reach maximum level of function.    Recent Surgery: Procedure(s) (LRB):  ROBOTIC ARTHROPLASTY, KNEE, TOTAL (Right) Day of Surgery    Plan:     During this hospitalization, patient to be seen BID to address the identified rehab impairments via gait training, therapeutic activities, therapeutic exercises and progress toward the following goals:    Plan of Care Expires:  02/17/25    Subjective     Chief Complaint: \FF2  Patient/Family Comments/goals:   Pain/Comfort:       Patients cultural, spiritual, Orthodoxy conflicts given the current situation:      Living Environment:  Pt lives alone in a house with 4 steps and left rail  Prior to admission, patients level of function was mod ind with cane or rw.  Equipment used at home: walker, rolling, cane, quad, grab bar (walk in tub).  DME owned (not currently used): .  Upon discharge, patient will have assistance from TBD.    Objective:     Communicated with nurse prior to session.  Patient found supine with pulse ox (continuous), telemetry, oxygen, peripheral IV  upon PT entry to room.    General Precautions: Standard, fall  Orthopedic Precautions:RLE weight bearing as tolerated   Braces: N/A, Knee immobilizer  Respiratory Status: Nasal cannula, flow    L/min    Exams:  Right knee flex 85, PROM 90  Right knee ext -45 PROM 0    Functional Mobility:  Bed Mobility:     Supine to Sit: minimum assistance  Transfers:     Sit to Stand:  moderate assistance with rolling walker  Bed to Chair: maximal assistance with  no AD  using  Stand Pivot  Gait: pt was buckling and not able to walk      AM-PAC 6 CLICK MOBILITY  Total Score:        Treatment & Education:      Patient left up in chair with all lines intact and call button in reach.    GOALS:   Multidisciplinary Problems       Physical Therapy Goals          Problem: Physical Therapy    Goal Priority Disciplines Outcome Interventions   Physical Therapy Goal     PT, PT/OT Progressing    Description: Pt will improve functional independence by performing:    Bed mobility: SBA  Sit to stand: SBA  Car Transfer: Min A  with rolling walker  Ambulation x 150' feet with SBA and rolling walker  1 Step (Curb): Min A  and rolling walker  3 Steps: Min A  and L HR  right knee AROM flexion (in degrees): 90  right knee AROM extension (in degrees): 0   Independent with total knee HEP                          DME Justifications:      History:     Past Medical History:   Diagnosis Date    Arthritis     DVT (deep venous thrombosis)     Left leg post Left TKA; 2024    HTN (hypertension)     Mild intermittent asthma, uncomplicated     Primary osteoarthritis of right knee 2025    Restless leg     Sleep apnea, unspecified     does not use machine       Past Surgical History:   Procedure Laterality Date    ARTHROSCOPY,KNEE,WITH MENISCUS REPAIR Left     CATARACT EXTRACTION W/  INTRAOCULAR LENS IMPLANT Bilateral      SECTION      And     CHOLECYSTECTOMY      COLONOSCOPY      DILATION AND CURETTAGE OF UTERUS      x 6 for missed AB    HYSTERECTOMY      NASAL SINUS SURGERY      SHOULDER ARTHROSCOPY W/ ROTATOR CUFF REPAIR Left     tailbone surgery      TOTAL KNEE ARTHROPLASTY Left        Time Tracking:     PT Received On:     PT Start Time: 1900     PT Stop Time: 1925  PT Total Time (min): 25 min     Billable Minutes: Evaluation 25 02/12/2025

## 2025-02-13 NOTE — PT/OT/SLP PROGRESS
"Occupational Therapy   Treatment    Name: Tami Howard  MRN: [id number]  Admitting Diagnosis:  Primary osteoarthritis of right knee  1 Day Post-Op    Recommendations:     Discharge Recommendations: High Intensity Therapy  Discharge Equipment Recommendations:  bedside commode, walker, rolling  Barriers to discharge:  Decreased caregiver support, Inaccessible home environment    Assessment:     Tami Howard is a 79 y.o. female with a medical diagnosis of Primary osteoarthritis of right knee.  Performance deficits affecting function are weakness, orthopedic precautions, impaired joint extensibility, edema, decreased ROM, impaired functional mobility, impaired self care skills, decreased lower extremity function, gait instability, impaired endurance, decreased safety awareness.     Rehab Prognosis:  Fair; patient would benefit from acute skilled OT services to address these deficits and reach maximum level of function.       Plan:     Patient to be seen  (BID) to address the above listed problems via self-care/home management, therapeutic activities, therapeutic exercises  Plan of Care Expires: 02/19/25  Plan of Care Reviewed with: patient    Subjective     Chief Complaint: no complaints  Patient/Family Comments/goals: "Well, I do feel a little winded."  Pain/Comfort:  Pain Rating 1:  (unrated)  Location - Side 1: Right  Location - Orientation 1: generalized  Location 1: knee  Pain Addressed 1: Distraction, Reposition    Objective:     Communicated with: NSG prior to session.  Patient found up in chair with peripheral IV, pulse ox (continuous), oxygen upon OT entry to room.    General Precautions: Standard, fall    Orthopedic Precautions:RLE weight bearing as tolerated  Braces: N/A  Respiratory Status: Nasal cannula, flow 2 L/min; difficulty with accurate oxygen saturation reading, max verbal cues for pursed lip breathing     Occupational Performance:     Functional Mobility/Transfers:  Patient completed Sit <> " Stand Transfer with min-mod A  with  rolling walker   Patient completed Toilet Transfer Step Transfer technique with minimum assistance with  rolling walker and bedside commode  Functional Mobility: Pt performed FM in hallway with RW and min A. Seated rest break taken. 2 people necessary for chair follow, assist with gait, and line management. 20 feet during first bout, 15 feet during second. Slow pace.     Activities of Daily Living:  Toileting: minimum assistance with clothing management    Patient left up in chair with all lines intact and call button in reach    GOALS:   Multidisciplinary Problems       Occupational Therapy Goals          Problem: Occupational Therapy    Goal Priority Disciplines Outcome Interventions   Occupational Therapy Goal     OT, PT/OT Progressing    Description: Pt will perform LB dressing standby with AE PRN by d/c.  Pt will perform toileting standby A by d/c.  Pt will perform toilet t/f standby A with LRAD by d/c.  Pt will perform tub t/f standby assist c TTB by d/c.                       DME Justifications:   Tami's mobility limitation cannot be sufficiently resolved by the use of a cane. Her functional mobility deficit can be sufficiently resolved with the use of a Rolling Walker. Patient's mobility limitation significantly impairs their ability to participate in one of more activities of daily living.  The use of a RW will significantly improve the patient's ability to participate in MRADLS and the patient will use it on regular basis in the home.    Time Tracking:     OT Date of Treatment: 02/13/25  OT Start Time: 1414  OT Stop Time: 1449  OT Total Time (min): 35 min    Billable Minutes:Self Care/Home Management 17  Non-billable conference charge 18 minutes    OT/WAYNE: OT          2/13/2025

## 2025-02-13 NOTE — PT/OT/SLP EVAL
"Occupational Therapy   Evaluation    Name: Tami Howard  MRN: [id number]  Admitting Diagnosis: Primary osteoarthritis of right knee  Recent Surgery: Procedure(s) (LRB):  ROBOTIC ARTHROPLASTY, KNEE, TOTAL (Right) 1 Day Post-Op    Recommendations:     Discharge Recommendations: High Intensity Therapy  Discharge Equipment Recommendations:  bedside commode, walker, rolling  Barriers to discharge:  Decreased caregiver support, Inaccessible home environment    Assessment:     Tami Howard is a 79 y.o. female with a medical diagnosis of Primary osteoarthritis of right knee. Performance deficits affecting function: weakness, impaired endurance, impaired self care skills, impaired functional mobility, decreased safety awareness, decreased lower extremity function, impaired balance, gait instability, orthopedic precautions, impaired joint extensibility, edema, decreased ROM.      Rehab Prognosis: Fair; patient would benefit from acute skilled OT services to address these deficits and reach maximum level of function.       Plan:     Patient to be seen  (BID) to address the above listed problems via self-care/home management, therapeutic activities, therapeutic exercises  Plan of Care Expires: 02/19/25  Plan of Care Reviewed with: patient    Subjective     Chief Complaint: pain  Patient/Family Comments/goals: "I feel fine."    Occupational Profile:  Living Environment: lives alone in [facility], [address], L HR  Previous level of function: modified independent with cane and/or RW  Equipment Used at Home: rollator, cane, quad  Assistance upon Discharge: TBD    Pain/Comfort:  Pain Rating 1:  (unrated)  Location - Side 1: Right  Location - Orientation 1: generalized  Location 1: knee  Pain Addressed 1: Distraction, Reposition, Pre-medicate for activity    Patients cultural, spiritual, Restorationism conflicts given the current situation: no    Objective:     Communicated with: NSG prior to session.  Patient found up in chair with pulse ox " (continuous), oxygen, peripheral IV upon OT entry to room.    General Precautions: Standard, fall  Orthopedic Precautions: RLE weight bearing as tolerated  Braces: N/A  Respiratory Status: Nasal cannula, flow 2 L/min    Occupational Performance:    Functional Mobility/Transfers:  Patient completed Sit <> Stand Transfer with minimum assistance  with  rolling walker   Patient completed Toilet Transfer Step Transfer technique with minimum assistance with  rolling walker and bedside commode  Functional Mobility: short FM in room for toileting    Activities of Daily Living:  Upper Body Dressing: minimum assistance for orientation for shirt, protecting IV  Lower Body Dressing: minimum assistance for pulling pants up over hips while standing  Toileting: minimum assistance for standing for clothing management, pt removed both hands from RW and had an episode of R knee buckling. Min-mod A to recover.     Cognitive/Visual Perceptual:  Cognitive/Psychosocial Skills:     -       Oriented to: Person, Place, Time, and Situation   -       Follows Commands/attention:Follows multistep  commands  -       Communication: clear/fluent  -       Memory: No Deficits noted  -       Safety awareness/insight to disability: impaired   -       Mood/Affect/Coping skills/emotional control: Appropriate to situation and Cooperative  Visual/Perceptual:      -Intact      Physical Exam:  Motor Planning: -       intact  Upper Extremity Range of Motion:     -       Right Upper Extremity: WFL  -       Left Upper Extremity: WFL  Upper Extremity Strength:    -       Right Upper Extremity: WFL  -       Left Upper Extremity: WFL    Treatment & Education:  Pt educated on roles and goals of occupational therapy, POC for acute stay.     Patient left up in chair with all lines intact and call button in reach    GOALS:   Multidisciplinary Problems       Occupational Therapy Goals          Problem: Occupational Therapy    Goal Priority Disciplines Outcome  Interventions   Occupational Therapy Goal     OT, PT/OT Progressing    Description: Pt will perform LB dressing standby with AE PRN by d/c.  Pt will perform toileting standby A by d/c.  Pt will perform toilet t/f standby A with LRAD by d/c.  Pt will perform tub t/f standby assist c TTB by d/c.                       DME Justifications:   Tami's mobility limitation cannot be sufficiently resolved by the use of a cane. Her functional mobility deficit can be sufficiently resolved with the use of a Rolling Walker. Patient's mobility limitation significantly impairs their ability to participate in one of more activities of daily living.  The use of a RW will significantly improve the patient's ability to participate in MRADLS and the patient will use it on regular basis in the home.    History:     Past Medical History:   Diagnosis Date    Arthritis     DVT (deep venous thrombosis)     Left leg post Left TKA; 2024    HTN (hypertension)     Mild intermittent asthma, uncomplicated     Primary osteoarthritis of right knee 2025    Restless leg     Sleep apnea, unspecified     does not use machine         Past Surgical History:   Procedure Laterality Date    ARTHROSCOPY,KNEE,WITH MENISCUS REPAIR Left     CATARACT EXTRACTION W/  INTRAOCULAR LENS IMPLANT Bilateral      SECTION      And     CHOLECYSTECTOMY      COLONOSCOPY      DILATION AND CURETTAGE OF UTERUS      x 6 for missed AB    HYSTERECTOMY      NASAL SINUS SURGERY      ROBOTIC ARTHROPLASTY, KNEE Right 2025    Procedure: ROBOTIC ARTHROPLASTY, KNEE, TOTAL;  Surgeon: Marcelo Henderson MD;  Location: [facility];  Service: Orthopedics;  Laterality: Right;  Payam    SHOULDER ARTHROSCOPY W/ ROTATOR CUFF REPAIR Left     tailbone surgery      TOTAL KNEE ARTHROPLASTY Left        Time Tracking:     OT Date of Treatment: 25  OT Start Time: 1115  OT Stop Time: 1145  OT Total Time (min): 30 min    Billable Minutes:Evaluation 30    2025

## 2025-02-13 NOTE — PLAN OF CARE
Problem: Adult Inpatient Plan of Care  Goal: Plan of Care Review  Outcome: Not Progressing  Goal: Patient-Specific Goal (Individualized)  Outcome: Not Progressing  Goal: Absence of Hospital-Acquired Illness or Injury  Outcome: Not Progressing  Goal: Optimal Comfort and Wellbeing  Outcome: Not Progressing  Goal: Readiness for Transition of Care  Outcome: Not Progressing     Problem: Wound  Goal: Optimal Coping  Outcome: Not Progressing  Goal: Optimal Functional Ability  Outcome: Not Progressing  Goal: Absence of Infection Signs and Symptoms  Outcome: Not Progressing  Goal: Improved Oral Intake  Outcome: Not Progressing  Goal: Optimal Pain Control and Function  Outcome: Not Progressing  Goal: Skin Health and Integrity  Outcome: Not Progressing  Goal: Optimal Wound Healing  Outcome: Not Progressing     Problem: Infection  Goal: Absence of Infection Signs and Symptoms  Outcome: Not Progressing     Problem: Knee Arthroplasty  Goal: Optimal Coping  Outcome: Not Progressing  Goal: Absence of Bleeding  Outcome: Not Progressing  Goal: Effective Bowel Elimination  Outcome: Not Progressing  Goal: Fluid and Electrolyte Balance  Outcome: Not Progressing  Goal: Optimal Functional Ability  Outcome: Not Progressing  Goal: Absence of Infection Signs and Symptoms  Outcome: Not Progressing  Goal: Intact Neurovascular Status  Outcome: Not Progressing  Goal: Anesthesia/Sedation Recovery  Outcome: Not Progressing  Goal: Optimal Pain Control and Function  Outcome: Not Progressing  Goal: Nausea and Vomiting Relief  Outcome: Not Progressing  Goal: Effective Urinary Elimination  Outcome: Not Progressing  Goal: Effective Oxygenation and Ventilation  Outcome: Not Progressing     Problem: Comorbidity Management  Goal: Maintenance of Asthma Control  Outcome: Not Progressing  Goal: Blood Pressure in Desired Range  Outcome: Not Progressing

## 2025-02-13 NOTE — PT/OT/SLP PROGRESS
"Physical Therapy Treatment    Patient Name:  Tami Howard   MRN:  [id number]    Recommendations:     Discharge therapy intensity: High Intensity Therapy   Discharge Equipment Recommendations: none  Barriers to discharge: Inaccessible home, Decreased caregiver support, and Impaired mobility    Assessment:     Tami Howard is a 79 y.o. female admitted with a medical diagnosis of Primary osteoarthritis of right knee s/p total knee arthroplasty.  She presents with the following impairments/functional limitations: weakness, impaired endurance, gait instability, impaired functional mobility, impaired self care skills, impaired balance, decreased safety awareness.    Rehab Prognosis: Good; patient would benefit from acute skilled PT services to address these deficits and reach maximum level of function.    Recent Surgery: Procedure(s) (LRB):  ROBOTIC ARTHROPLASTY, KNEE, TOTAL (Right) 1 Day Post-Op    Plan:     During this hospitalization, patient would benefit from acute PT services BID to address the identified rehab impairments via gait training, therapeutic activities, therapeutic exercises and progress toward the following goals:    Plan of Care Expires:  02/18/25    Subjective     Chief Complaint: "I'm so sleepy"  Patient/Family Comments/goals: agreeable to participate  Pain/Comfort:  Pain Rating 1: 1/10  Location - Side 1: Right  Location - Orientation 1: generalized  Location 1: knee  Pain Addressed 1: Distraction, Reposition      Objective:     Communicated with RN prior to session.  Patient found HOB elevated with oxygen, pulse ox (continuous) upon PT entry to room.     General Precautions: Standard, fall  Orthopedic Precautions: RLE weight bearing as tolerated  Braces: N/A  Respiratory Status: Nasal cannula, flow 2 L/min; difficulty getting accurate SpO2 readings with mobility, however, appeared to fluctuate between 85-97%. Patient educated and encouraged to perform pursed lip breathing with all mobility. "       Functional Mobility:  Transfers:     Sit to Stand: x3 reps; minimal to moderate assistance with rolling walker. VC for upright posture.  Toilet Transfer: minimal assistance with  rolling walker  using  Step Transfer; (+) void, independent with hygiene   Gait: The pt ambulated 20' + 15' with RW and min A; seated break between trials. Step-to gait pattern, slowed pace. Instance of R knee buckle during second bout of walking. Close chair follow. VC for RW proximity and safety awareness. Increased time to perform.       Education:  Patient provided with verbal education education regarding PT role/goals/POC, post-op precautions, fall prevention, safety awareness, and discharge/DME recommendations.  Understanding was verbalized.     Patient left up in chair with all lines intact and call button in reach    GOALS:   Multidisciplinary Problems       Physical Therapy Goals          Problem: Physical Therapy    Goal Priority Disciplines Outcome Interventions   Physical Therapy Goal     PT, PT/OT Progressing    Description: Pt will improve functional independence by performing:    Bed mobility: SBA  Sit to stand: SBA  Car Transfer: Min A  with rolling walker  Ambulation x 150' feet with SBA and rolling walker  1 Step (Curb): Min A  and rolling walker  3 Steps: Min A  and L HR  right knee AROM flexion (in degrees): 90  right knee AROM extension (in degrees): 0   Independent with total knee HEP                          Time Tracking:     PT Received On:    PT Start Time: 1415     PT Stop Time: 1450  PT Total Time (min): 35 min     Billable Minutes: Gait Training 18 , non-billable conference charge 17     Treatment Type: Treatment  PT/PTA: PT     Number of PTA visits since last PT visit: 0     02/13/2025

## 2025-02-13 NOTE — PT/OT/SLP PROGRESS
Physical Therapy Treatment    Patient Name:  Tami Howard   MRN:  [id number]    Recommendations:     Discharge therapy intensity: High Intensity Therapy   Discharge Equipment Recommendations: none  Barriers to discharge: Inaccessible home, Decreased caregiver support, and Impaired mobility    Assessment:     Tami Howard is a 79 y.o. female admitted with a medical diagnosis of Primary osteoarthritis of right knee s/p total knee arthroplasty.  She presents with the following impairments/functional limitations: weakness, impaired endurance, gait instability, impaired functional mobility, impaired self care skills, impaired balance, decreased safety awareness. Patient with improved tolerance of session this morning, but continues to demonstrate instability in gait. She lives alone in  house with 4 steps to enter and has no one to assist her upon discharge. She would benefit from high intensity therapy upon discharge to maximize independence and functional mobility prior to returning home.     Rehab Prognosis: Good; patient would benefit from acute skilled PT services to address these deficits and reach maximum level of function.    Recent Surgery: Procedure(s) (LRB):  ROBOTIC ARTHROPLASTY, KNEE, TOTAL (Right) 1 Day Post-Op    Plan:     During this hospitalization, patient would benefit from acute PT services BID to address the identified rehab impairments via gait training, therapeutic activities, therapeutic exercises and progress toward the following goals:    Plan of Care Expires:  02/18/25    Subjective     Chief Complaint: weakness  Patient/Family Comments/goals: agreeable to participate with therapy  Pain/Comfort:  Pain Rating 1: other (see comments) (unrated)  Location - Side 1: Right  Location 1: knee  Pain Addressed 1: Pre-medicate for activity, Reposition, Distraction      Objective:     Communicated with RN prior to session.  Patient found HOB elevated with oxygen, pulse ox (continuous), peripheral IV  upon PT entry to room.     General Precautions: Standard, fall  Orthopedic Precautions: RLE weight bearing as tolerated  Braces: N/A  Respiratory Status: Nasal cannula, flow 2 L/min; SpO2 91-97% throughout session      Functional Mobility:  Bed Mobility:     Supine to Sit: moderate assistance for trunk and RLE assist  Transfers:     Sit to Stand:  moderate assistance with rolling walker  Gait: The pt ambulated 10 ft with RW and min A. Slowed step-to gait pattern. Intermittent R knee hyperextension and instance of knee buckling noted near end of bout. Able to recover on own. Close chair follow. VC for RW proximity.     Therapeutic Activities/Exercises:  The pt performed x 10 reps of total knee home exercise program on the RLE.    (In degrees) AROM PROM   R knee flexion 65 85   R knee extension 8 5       Education:  Patient provided with verbal education and handouts education regarding PT role/goals/POC, post-op precautions, fall prevention, safety awareness, discharge/DME recommendations, and home exercise program.  Understanding was verbalized.     Patient left up in chair with all lines intact and call button in reach    GOALS:   Multidisciplinary Problems       Physical Therapy Goals          Problem: Physical Therapy    Goal Priority Disciplines Outcome Interventions   Physical Therapy Goal     PT, PT/OT Progressing    Description: Pt will improve functional independence by performing:    Bed mobility: SBA  Sit to stand: SBA  Car Transfer: Min A  with rolling walker  Ambulation x 150' feet with SBA and rolling walker  1 Step (Curb): Min A  and rolling walker  3 Steps: Min A  and L HR  right knee AROM flexion (in degrees): 90  right knee AROM extension (in degrees): 0   Independent with total knee HEP                          Time Tracking:     PT Received On:    PT Start Time: 1045     PT Stop Time: 1110  PT Total Time (min): 25 min     Billable Minutes: Gait Training 12 and Therapeutic Exercise 13    Treatment  Type: Treatment  PT/PTA: PT     Number of PTA visits since last PT visit: 0     02/13/2025

## 2025-02-13 NOTE — PROGRESS NOTES
No acute events overnight.  Pain controlled.    Patient laying in bed, no complaints.       A/P:  Status post right Total Hip  Overall patient doing well.  Therapy for mobility and ambulation.    +FHL/EHL  BCR distally  Dressing c/d/i  SILT distally    Co-morbidities Mgt:  Hx VTE/PE/DVT - SCDs, HERMILO hose, Eliquis for DVT prophylaxis, Early ambulation. Eliquis prescription sent to [facility] pharmacy with [employer]oup[employer] for 30 day free trial. Patient to  at no cost upon discharge.      Sleep Apnea - Oxygen PRN, Monitor oxygen saturation     Restless Leg syndrome - Home meds restarted     Asthma - Nebs PRN, Oxygen PRN     Obesity - BMI 31     Hypertension - Home meds restarted, monitor closely and adjust plan of care accordingly.   Patient remains hypotensive, continue IV fluids, monitor closely.  Hold night blood pressure medicines for BP less than 110/60.      Assessment and Plan:   Excessive Drowsiness - likely due to low pain medication tolerance, improving but not resolved.   Medication adjustments until balance is achieved.  Continue to hydrate. Monitor closely.  DC gabapentin  Continue using Oxygen as needed, stimulate as much as possible. Limit the use or narcotics.   Patient has Tylenol listed on allergy list due to a past history of elevated liver enzymes.  Patient was not able to recall the events of her last total joint replacement.  In an effort to reduce narcotics and maintain appropriate level of consciousness we will order Tylenol 500 mg q.4 around the clock.  LFTs prior to surgery within normal limits.  We will check CMP in a.m.  Benefit outweighs the risk on using Tylenol at this point    -Patient not progressing as well as expected in PT/OT. Will keep hospitalized one more night, continue with aggressive PT/OT, for gait training, ADLs and strengthening. Plan for discharge tomw if patient is deemed safe from a mobility/safety standpoint.      Disposition pending, patient requesting placement in a rehab  facility.    Convert to inpatient status.  Will keep hospitalized at least 1 more night, adjust plan of care as needed and plan for discharge once pain is controlled, the patient is tolerating pain medications appropriately, all systems are working appropriately, the patient is stable and at baseline.    DVT Prophylaxis:  Eliquis 2.5 mg BID, HERMILO Hose, SCDs, Early ambulation  GI Prophylaxis: Pepcid BID  Bowel Regimen: Reglan/Miralax/Senokot S/Colace  Pain Controlled  (+ Voiding) (+ Flatus) (-BM)    Ann Gutierrez FNP-C  Orthopedic Surgery  [facility] - [facility]     This note was created with the assistance of voice recognition software or phone dictation.  There may be transcription errors as a result of using this technology however minimal. Effort has been made to assure accuracy of transcription but any obvious errors or omissions should be clarified with the author of the document.

## 2025-02-14 LAB
ALBUMIN SERPL-MCNC: 2.7 G/DL (ref 3.4–4.8)
ALBUMIN/GLOB SERPL: 0.9 RATIO (ref 1.1–2)
ALP SERPL-CCNC: 79 UNIT/L (ref 40–150)
ALT SERPL-CCNC: 48 UNIT/L (ref 0–55)
ANION GAP SERPL CALC-SCNC: 7 MEQ/L
AST SERPL-CCNC: 53 UNIT/L (ref 5–34)
BILIRUB SERPL-MCNC: 0.7 MG/DL
BUN SERPL-MCNC: 16.2 MG/DL (ref 9.8–20.1)
CALCIUM SERPL-MCNC: 8.4 MG/DL (ref 8.4–10.2)
CHLORIDE SERPL-SCNC: 104 MMOL/L (ref 98–107)
CO2 SERPL-SCNC: 28 MMOL/L (ref 23–31)
CREAT SERPL-MCNC: 0.81 MG/DL (ref 0.55–1.02)
CREAT/UREA NIT SERPL: 20
ERYTHROCYTE [DISTWIDTH] IN BLOOD BY AUTOMATED COUNT: 13.7 % (ref 11.5–17)
GFR SERPLBLD CREATININE-BSD FMLA CKD-EPI: >60 ML/MIN/1.73/M2
GLOBULIN SER-MCNC: 3.1 GM/DL (ref 2.4–3.5)
GLUCOSE SERPL-MCNC: 109 MG/DL (ref 82–115)
HCT VFR BLD AUTO: 30 % (ref 37–47)
HGB BLD-MCNC: 9.9 G/DL (ref 12–16)
MCH RBC QN AUTO: 31.1 PG (ref 27–31)
MCHC RBC AUTO-ENTMCNC: 33 G/DL (ref 33–36)
MCV RBC AUTO: 94.3 FL (ref 80–94)
NRBC BLD AUTO-RTO: 0 %
PLATELET # BLD AUTO: 158 X10(3)/MCL (ref 130–400)
PMV BLD AUTO: 10.2 FL (ref 7.4–10.4)
POCT GLUCOSE: 99 MG/DL (ref 70–110)
POCT GLUCOSE: 99 MG/DL (ref 70–110)
POTASSIUM SERPL-SCNC: 3.6 MMOL/L (ref 3.5–5.1)
PROT SERPL-MCNC: 5.8 GM/DL (ref 5.8–7.6)
RBC # BLD AUTO: 3.18 X10(6)/MCL (ref 4.2–5.4)
SODIUM SERPL-SCNC: 139 MMOL/L (ref 136–145)
WBC # BLD AUTO: 6.42 X10(3)/MCL (ref 4.5–11.5)

## 2025-02-14 PROCEDURE — 97116 GAIT TRAINING THERAPY: CPT | Mod: CQ

## 2025-02-14 PROCEDURE — 94761 N-INVAS EAR/PLS OXIMETRY MLT: CPT

## 2025-02-14 PROCEDURE — 85027 COMPLETE CBC AUTOMATED: CPT | Performed by: SPECIALIST

## 2025-02-14 PROCEDURE — 27000221 HC OXYGEN, UP TO 24 HOURS

## 2025-02-14 PROCEDURE — 97535 SELF CARE MNGMENT TRAINING: CPT

## 2025-02-14 PROCEDURE — 97110 THERAPEUTIC EXERCISES: CPT

## 2025-02-14 PROCEDURE — 25000003 PHARM REV CODE 250: Performed by: SPECIALIST

## 2025-02-14 PROCEDURE — 94799 UNLISTED PULMONARY SVC/PX: CPT

## 2025-02-14 PROCEDURE — 97530 THERAPEUTIC ACTIVITIES: CPT

## 2025-02-14 PROCEDURE — 80053 COMPREHEN METABOLIC PANEL: CPT | Performed by: NURSE PRACTITIONER

## 2025-02-14 PROCEDURE — 25000003 PHARM REV CODE 250: Performed by: NURSE PRACTITIONER

## 2025-02-14 PROCEDURE — 99900031 HC PATIENT EDUCATION (STAT)

## 2025-02-14 PROCEDURE — 11000001 HC ACUTE MED/SURG PRIVATE ROOM

## 2025-02-14 PROCEDURE — 97530 THERAPEUTIC ACTIVITIES: CPT | Mod: CQ

## 2025-02-14 PROCEDURE — 36415 COLL VENOUS BLD VENIPUNCTURE: CPT | Performed by: NURSE PRACTITIONER

## 2025-02-14 RX ORDER — TRAMADOL HYDROCHLORIDE 50 MG/1
50 TABLET ORAL EVERY 4 HOURS PRN
Status: DISCONTINUED | OUTPATIENT
Start: 2025-02-14 | End: 2025-02-15 | Stop reason: HOSPADM

## 2025-02-14 RX ORDER — KETOROLAC TROMETHAMINE 10 MG/1
10 TABLET, FILM COATED ORAL
Status: DISCONTINUED | OUTPATIENT
Start: 2025-02-14 | End: 2025-02-15 | Stop reason: HOSPADM

## 2025-02-14 RX ORDER — ACETAMINOPHEN 500 MG
500 TABLET ORAL EVERY 4 HOURS PRN
Status: DISCONTINUED | OUTPATIENT
Start: 2025-02-14 | End: 2025-02-15 | Stop reason: HOSPADM

## 2025-02-14 RX ADMIN — KETOROLAC TROMETHAMINE 10 MG: 10 TABLET, FILM COATED ORAL at 06:02

## 2025-02-14 RX ADMIN — METHOCARBAMOL 500 MG: 500 TABLET ORAL at 01:02

## 2025-02-14 RX ADMIN — FAMOTIDINE 20 MG: 20 TABLET, FILM COATED ORAL at 08:02

## 2025-02-14 RX ADMIN — POLYETHYLENE GLYCOL 3350 17 G: 17 POWDER, FOR SOLUTION ORAL at 08:02

## 2025-02-14 RX ADMIN — ACETAMINOPHEN 500 MG: 500 TABLET ORAL at 01:02

## 2025-02-14 RX ADMIN — SENNOSIDES AND DOCUSATE SODIUM 2 TABLET: 50; 8.6 TABLET ORAL at 08:02

## 2025-02-14 RX ADMIN — ROPINIROLE HYDROCHLORIDE 3 MG: 1 TABLET, FILM COATED ORAL at 08:02

## 2025-02-14 RX ADMIN — DOCUSATE SODIUM 200 MG: 100 CAPSULE, LIQUID FILLED ORAL at 05:02

## 2025-02-14 RX ADMIN — APIXABAN 2.5 MG: 2.5 TABLET, FILM COATED ORAL at 08:02

## 2025-02-14 RX ADMIN — TRAMADOL HYDROCHLORIDE 50 MG: 50 TABLET, COATED ORAL at 08:02

## 2025-02-14 RX ADMIN — ACETAMINOPHEN 500 MG: 500 TABLET ORAL at 05:02

## 2025-02-14 RX ADMIN — METOPROLOL SUCCINATE 25 MG: 25 TABLET, EXTENDED RELEASE ORAL at 08:02

## 2025-02-14 RX ADMIN — HYDROCHLOROTHIAZIDE 25 MG: 25 TABLET ORAL at 08:02

## 2025-02-14 RX ADMIN — KETOROLAC TROMETHAMINE 10 MG: 10 TABLET, FILM COATED ORAL at 11:02

## 2025-02-14 RX ADMIN — SERTRALINE HYDROCHLORIDE 100 MG: 50 TABLET ORAL at 08:02

## 2025-02-14 RX ADMIN — LOSARTAN POTASSIUM 100 MG: 50 TABLET, FILM COATED ORAL at 08:02

## 2025-02-14 RX ADMIN — KETOROLAC TROMETHAMINE 10 MG: 10 TABLET, FILM COATED ORAL at 05:02

## 2025-02-14 NOTE — PT/OT/SLP PROGRESS
Physical Therapy Treatment    Patient Name:  Tami Howard   MRN:  [id number]    Recommendations:     Discharge Recommendations: High Intensity Therapy  Discharge Equipment Recommendations: none  Barriers to discharge: Inaccessible home, Decreased caregiver support, and impaired functional mobility    Assessment:     Tami Howard is a 79 y.o. female admitted with a medical diagnosis of Primary osteoarthritis of right knee.  She presents with the following impairments/functional limitations: weakness, impaired endurance, gait instability, impaired functional mobility, impaired self care skills, impaired balance, decreased safety awareness .    PT NOTE: prior to start of session, PT spoke with nsg. Nsg noted low oxygen and advised to use oxygen for session.    Rehab Prognosis: Good; patient would benefit from acute skilled PT services to address these deficits and reach maximum level of function.    Recent Surgery: Procedure(s) (LRB):  ROBOTIC ARTHROPLASTY, KNEE, TOTAL (Right) 2 Days Post-Op    Plan:     During this hospitalization, patient to be seen BID to address the identified rehab impairments via gait training, therapeutic activities, therapeutic exercises and progress toward the following goals:    Plan of Care Expires:  02/18/25    Subjective     Chief Complaint: posterior right knee pain  Patient/Family Comments/goals: n/a  Pain/Comfort:  Pain Rating 1: 0/10  Location - Side 1: Right  Location - Orientation 1: posterior  Location 1: knee  Pain Addressed 1: Pre-medicate for activity  Pain Rating Post-Intervention 1: 4/10      Objective:     Communicated with nsg prior to session.  Patient found HOB elevated with   upon PT entry to room.     General Precautions: Standard, fall  Orthopedic Precautions: RLE weight bearing as tolerated  Braces: N/A  Respiratory Status:  None upon entry to room; 2L in nasal cannula added by PT     Functional Mobility:  Bed Mobility:     Supine to Sit: modified  independence  Transfers:     Sit to Stand:  contact guard assistance with rolling walker  Toilet Transfer: contact guard assistance with  rolling walker  using  Step Transfer, positive void & BM, increased time required  Gait: contact guard asistance with Rw, pt ambulated 85' with step-to gait, no LOB noted    Treatment & Education:  Pt educated on importance of out of bed mobility, frequent ambulation, and fall prevention.  Pt educated on management of swelling through RICE method.  Pt performed 10 reps of quad sets and hip/knee flexion in supine.      (In degrees) AROM PROM   R knee flexion 70 89   R knee extension 5 3         Patient left up in chair with call button in reach..    GOALS:   Multidisciplinary Problems       Physical Therapy Goals          Problem: Physical Therapy    Goal Priority Disciplines Outcome Interventions   Physical Therapy Goal     PT, PT/OT Progressing    Description: Pt will improve functional independence by performing:    Bed mobility: SBA  Sit to stand: SBA  Car Transfer: Min A  with rolling walker  Ambulation x 150' feet with SBA and rolling walker  1 Step (Curb): Min A  and rolling walker  3 Steps: Min A  and L HR  right knee AROM flexion (in degrees): 90  right knee AROM extension (in degrees): 0   Independent with total knee HEP                          DME Justifications:   Tami's mobility limitation cannot be sufficiently resolved by the use of a cane. Her functional mobility deficit can be sufficiently resolved with the use of a Rolling Walker. Patient's mobility limitation significantly impairs their ability to participate in one of more activities of daily living.  The use of a RW will significantly improve the patient's ability to participate in MRADLS and the patient will use it on regular basis in the home.    Time Tracking:     PT Received On:    PT Start Time: 0833     PT Stop Time: 0859  PT Total Time (min): 26 min     Billable Minutes: Gait Training 16 min and  Therapeutic Activity 8 min    Treatment Type: Treatment  PT/PTA: PTA     Number of PTA visits since last PT visit: 1 02/14/2025

## 2025-02-14 NOTE — PT/OT/SLP PROGRESS
Occupational Therapy      Patient Name:  Tami Howard   MRN:  [id number]    Patient not seen this afternoon secondary to pt refusal - pt crying upon OT entry, on the phone. OT inquired about participation in therapy, pt asking to speak to MEGAN Bolton and to hold therapy at this time. Will follow-up tomorrow AM.    2/14/2025

## 2025-02-14 NOTE — PLAN OF CARE
Problem: Adult Inpatient Plan of Care  Goal: Plan of Care Review  2/14/2025 0112 by Yoselin Allen RN  Outcome: Progressing  2/14/2025 0112 by Yoselin Allen RN  Outcome: Progressing  Goal: Patient-Specific Goal (Individualized)  2/14/2025 0112 by Yoselin Allen RN  Outcome: Progressing  2/14/2025 0112 by Yoselin Allen RN  Outcome: Progressing  Goal: Absence of Hospital-Acquired Illness or Injury  2/14/2025 0112 by Yoselin Allen RN  Outcome: Progressing  2/14/2025 0112 by Yoselin Allen RN  Outcome: Progressing  Goal: Optimal Comfort and Wellbeing  2/14/2025 0112 by Yoselin Allen RN  Outcome: Progressing  2/14/2025 0112 by Yoselin Allen RN  Outcome: Progressing  Goal: Readiness for Transition of Care  2/14/2025 0112 by Yoselin Allen RN  Outcome: Progressing  2/14/2025 0112 by Yoselin Allen RN  Outcome: Progressing     Problem: Wound  Goal: Optimal Coping  2/14/2025 0112 by Yoselin Allen RN  Outcome: Progressing  2/14/2025 0112 by Yoselin Allen RN  Outcome: Progressing  Goal: Optimal Functional Ability  2/14/2025 0112 by Yoselin Allen RN  Outcome: Progressing  2/14/2025 0112 by Yoselin Allen RN  Outcome: Progressing  Goal: Absence of Infection Signs and Symptoms  2/14/2025 0112 by Yoselin Allen RN  Outcome: Progressing  2/14/2025 0112 by Yoselin Allen RN  Outcome: Progressing  Goal: Improved Oral Intake  2/14/2025 0112 by Yoselin Allen RN  Outcome: Progressing  2/14/2025 0112 by Yoselin Allen RN  Outcome: Progressing  Goal: Optimal Pain Control and Function  2/14/2025 0112 by Yoselin Allen RN  Outcome: Progressing  2/14/2025 0112 by Yoselin Allen RN  Outcome: Progressing  Goal: Skin Health and Integrity  2/14/2025 0112 by Yoselin Allen RN  Outcome: Progressing  2/14/2025 0112 by Yoselin Allen RN  Outcome: Progressing  Goal: Optimal Wound Healing  2/14/2025 0112 by Yoselin Allen RN  Outcome: Progressing  2/14/2025 0112 by Yoselin Allen, RN  Outcome: Progressing     Problem:  Infection  Goal: Absence of Infection Signs and Symptoms  2/14/2025 0112 by Yoselin Allen RN  Outcome: Progressing  2/14/2025 0112 by Yoselin Allen RN  Outcome: Progressing     Problem: Knee Arthroplasty  Goal: Optimal Coping  2/14/2025 0112 by Yoselin Allen RN  Outcome: Progressing  2/14/2025 0112 by Yoselin Allen RN  Outcome: Progressing  Goal: Absence of Bleeding  2/14/2025 0112 by Yoselin Allen RN  Outcome: Progressing  2/14/2025 0112 by Yoselin Allen RN  Outcome: Progressing  Goal: Effective Bowel Elimination  2/14/2025 0112 by Yoselin Allen RN  Outcome: Progressing  2/14/2025 0112 by Yoselin Allen RN  Outcome: Progressing  Goal: Fluid and Electrolyte Balance  2/14/2025 0112 by Yoselin Allen RN  Outcome: Progressing  2/14/2025 0112 by Yoselin Allen RN  Outcome: Progressing  Goal: Optimal Functional Ability  2/14/2025 0112 by Yoselin Allen RN  Outcome: Progressing  2/14/2025 0112 by Yoselin Allen RN  Outcome: Progressing  Goal: Absence of Infection Signs and Symptoms  2/14/2025 0112 by Yoselin Allen RN  Outcome: Progressing  2/14/2025 0112 by Yoselin Allen RN  Outcome: Progressing  Goal: Intact Neurovascular Status  2/14/2025 0112 by Yoselin Allen RN  Outcome: Progressing  2/14/2025 0112 by Yoselin Allen RN  Outcome: Progressing  Goal: Anesthesia/Sedation Recovery  2/14/2025 0112 by Yoselin Allen RN  Outcome: Progressing  2/14/2025 0112 by Yoselin Allen RN  Outcome: Progressing  Goal: Optimal Pain Control and Function  Outcome: Progressing  Goal: Nausea and Vomiting Relief  Outcome: Progressing  Goal: Effective Urinary Elimination  Outcome: Progressing  Goal: Effective Oxygenation and Ventilation  Outcome: Progressing     Problem: Comorbidity Management  Goal: Maintenance of Asthma Control  Outcome: Progressing  Goal: Blood Pressure in Desired Range  Outcome: Progressing     Problem: Skin Injury Risk Increased  Goal: Skin Health and Integrity  Outcome: Progressing

## 2025-02-14 NOTE — PLAN OF CARE
F/u with [facility] - bed not avail; unable to accept.     F/u with [facility] ( [facility])-- denied.     Notified Dr Henderson-- plan for dc w hh on Monday. Cont therapy.     Pt notified. Verb under.       Faxed referral for RW to [facility].

## 2025-02-14 NOTE — PROGRESS NOTES
"  The patient is status post right total knee arthroplasty postoperative day number 2  Asleep in bed.  Arousable yet drowsy.  Pain controlled with Tylenol   Progressing slowly with PT/OT  No issues reported by nursing overnight     Vital Signs  Temp: 98.5 °F (36.9 °C)  Temp Source: Oral  Pulse: 62  Heart Rate Source: Monitor  Resp: 16  SpO2: 96 %  Pulse Oximetry Type: Intermittent  Flow (L/min) (Oxygen Therapy): 2  Oxygen Concentration (%): 80  Device (Oxygen Therapy): nasal cannula  BP: (!) 90/48  BP Location: Right arm  BP Method: Automatic  Patient Position: Lying  Arousal Level: opens eyes spontaneously  Height and Weight  Height: 5' 7" (170.2 cm)  Height Method: Stated  Weight: 90.7 kg (200 lb)  Weight Method: Standard Scale  BSA (Calculated - sq m): 2.07 sq meters  BMI (Calculated): 31.3  Weight in (lb) to have BMI = 25: 159.3]    +FHL/EHL  BCR distally  Dressing c/d/I  Thigh and calf compartments soft and compressible  SILT distally    Recent Lab Results  (Last 5 results in the past 24 hours)        02/14/25  0552   02/14/25  0511   02/13/25  1958   02/13/25  1628   02/13/25  1137        Albumin/Globulin Ratio   0.9             Albumin   2.7             ALP   79             ALT   48             Anion Gap   7.0             AST   53             BILIRUBIN TOTAL   0.7             BUN   16.2             BUN/CREAT RATIO   20             Calcium   8.4             Chloride   104             CO2   28             Creatinine   0.81             eGFR   >60  Comment: Estimated GFR calculated using the CKD-EPI creatinine (2021) equation.             Globulin, Total   3.1             Glucose   109             Hematocrit   30.0             Hemoglobin   9.9             MCH   31.1             MCHC   33.0             MCV   94.3             MPV   10.2             nRBC   0.0             Platelet Count   158             POCT Glucose 99     112   103   107       Potassium   3.6             PROTEIN TOTAL   5.8             RBC   " 3.18             RDW   13.7             Sodium   139             WBC   6.42                                    A/P:  Status post right total knee arthroplasty postop day 2.  Orthopedically stable  Tylenol p.r.n. for pain, narcotics held secondary to excessive drowsiness and likely low pain medication tolerance  Continue with postoperative care, PT/OT  Eliquis for DVT Ppx  Awaiting placement  Ortho we will follow

## 2025-02-14 NOTE — PT/OT/SLP PROGRESS
Occupational Therapy   Treatment    Name: Tami Howard  MRN: [id number]  Admitting Diagnosis:  Primary osteoarthritis of right knee  2 Days Post-Op    Recommendations:     Discharge Recommendations: High Intensity Therapy  Discharge Equipment Recommendations:  bedside commode, walker, rolling  Barriers to discharge:  Decreased caregiver support, Inaccessible home environment    Assessment:     Tami Howard is a 79 y.o. female with a medical diagnosis of Primary osteoarthritis of right knee. Performance deficits affecting function are weakness, pain, decreased ROM, impaired endurance, impaired functional mobility, impaired self care skills, decreased lower extremity function, decreased safety awareness, orthopedic precautions, impaired joint extensibility.     Rehab Prognosis:  Good; patient would benefit from acute skilled OT services to address these deficits and reach maximum level of function.       Plan:     Patient to be seen  (BID) to address the above listed problems via self-care/home management, therapeutic activities, therapeutic exercises  Plan of Care Expires: 02/19/25  Plan of Care Reviewed with: patient    Subjective     Chief Complaint: no complaints  Patient/Family Comments/goals: go to rehab  Pain/Comfort:  Pain Rating 1: 2/10  Location - Side 1: Right  Location - Orientation 1: generalized  Location 1: knee  Pain Addressed 1: Distraction, Reposition    Objective:     Communicated with: NSG prior to session.  Patient found up in chair with peripheral IV, pulse ox (continuous) upon OT entry to room.    General Precautions: Standard, fall    Orthopedic Precautions:RLE weight bearing as tolerated  Braces: N/A  Respiratory Status: Nasal cannula, flow 2 L/min     Occupational Performance:     Functional Mobility/Transfers:  Patient completed Sit <> Stand Transfer with contact guard assistance  with  rolling walker   Functional Mobility: Pt performed FM in hallway with RW and CGA, sometimes SBA,  tolerated ~200 feet with no LOBs. One seated rest break taken.    Activities of Daily Living:  Lower Body Dressing: contact guard assistance with use of reacher and sock aid to doff and don socks, OT instructed in technique and demonstrated  use of AE    Patient left up in chair with all lines intact and call button in reach    GOALS:   Multidisciplinary Problems       Occupational Therapy Goals          Problem: Occupational Therapy    Goal Priority Disciplines Outcome Interventions   Occupational Therapy Goal     OT, PT/OT Progressing    Description: Pt will perform LB dressing standby with AE PRN by d/c.  Pt will perform toileting standby A by d/c.  Pt will perform toilet t/f standby A with LRAD by d/c.  Pt will perform tub t/f standby assist c TTB by d/c.                       DME Justifications:   Tami's mobility limitation cannot be sufficiently resolved by the use of a cane. Her functional mobility deficit can be sufficiently resolved with the use of a Rolling Walker. Patient's mobility limitation significantly impairs their ability to participate in one of more activities of daily living.  The use of a RW will significantly improve the patient's ability to participate in MRADLS and the patient will use it on regular basis in the home.    Time Tracking:     OT Date of Treatment: 02/14/25  OT Start Time: 1010  OT Stop Time: 1045  OT Total Time (min): 35 min    Billable Minutes:Self Care/Home Management 15  Therapeutic Activity 20    OT/WAYNE: OT          2/14/2025

## 2025-02-14 NOTE — PROGRESS NOTES
Patient is sitting up in the chair.  Ambulated over 100 feet.  No pain.  Were just clean drying and tack.  No signs of infection.  Neurovascular intact  Taking only Tylenol.  She has been informed that there is no availability in rehab and [facility] has denied accepting her. She was informed preoperatively and again during this hospital stay that she needs to make plans for help at home and that we can arrange home health. She understands this and is making those arrangements and plan will be to likely discharge her with home health physical therapy over the weekend.

## 2025-02-14 NOTE — PT/OT/SLP PROGRESS
"Physical Therapy Treatment    Patient Name:  Tami Howard   MRN:  [id number]    Recommendations:     Discharge Recommendations: High Intensity Therapy  Discharge Equipment Recommendations: none  Barriers to discharge: Decreased caregiver support    Assessment:     Tami Howard is a 79 y.o. female admitted with a medical diagnosis of Primary osteoarthritis of right knee.  She presents with the following impairments/functional limitations: weakness, impaired endurance, gait instability, impaired functional mobility, impaired self care skills, impaired balance, decreased safety awareness       PT NOTE: pt very upset upon entry. Pt reports she is unable to go to rehab, but went for last knee surgery. Pt required encouragement to participate. Pt agreed to semi supin therX in recliner only. Pt reports that she will be staying with brother and that he has no steps to enter home..    Rehab Prognosis: Good; patient would benefit from acute skilled PT services to address these deficits and reach maximum level of function.    Recent Surgery: Procedure(s) (LRB):  ROBOTIC ARTHROPLASTY, KNEE, TOTAL (Right) 2 Days Post-Op    Plan:     During this hospitalization, patient to be seen BID to address the identified rehab impairments via gait training, therapeutic activities, therapeutic exercises and progress toward the following goals:    Plan of Care Expires:  02/18/25    Subjective     Chief Complaint: "I dont understand. I pay all this money for insurance"  Patient/Family Comments/goals: "I want to get better"  Pain/Comfort:  Pain Rating 1: 0/10      Objective:     Communicated with RN prior to session.  Patient found up in chair with   upon PT entry to room.     General Precautions: Standard, fall  Orthopedic Precautions: RLE weight bearing as tolerated  Braces: N/A  Respiratory Status: Room air     Treatment & Education:  Semi supine therX 10 x TKE protocol    Quad set    Hip and knee flexion    TKE over towel    Seated knee " flexion with towel     Patient left up in chair with all lines intact and call button in reach..    GOALS:   Multidisciplinary Problems       Physical Therapy Goals          Problem: Physical Therapy    Goal Priority Disciplines Outcome Interventions   Physical Therapy Goal     PT, PT/OT Progressing    Description: Pt will improve functional independence by performing:    Bed mobility: SBA  Sit to stand: SBA  Car Transfer: Min A  with rolling walker  Ambulation x 150' feet with SBA and rolling walker  1 Step (Curb): Min A  and rolling walker  3 Steps: Min A  and L HR  right knee AROM flexion (in degrees): 90  right knee AROM extension (in degrees): 0   Independent with total knee HEP                          Time Tracking:     PT Received On:    PT Start Time: 1432     PT Stop Time: 1441  PT Total Time (min): 9 min     Billable Minutes: Therapeutic Exercise 9    Treatment Type: Treatment  PT/PTA: PT     Number of PTA visits since last PT visit: 1 02/14/2025

## 2025-02-14 NOTE — PLAN OF CARE
Problem: Adult Inpatient Plan of Care  Goal: Plan of Care Review  Outcome: Progressing  Goal: Patient-Specific Goal (Individualized)  Outcome: Progressing  Goal: Absence of Hospital-Acquired Illness or Injury  Outcome: Progressing  Goal: Optimal Comfort and Wellbeing  Outcome: Progressing  Goal: Readiness for Transition of Care  Outcome: Progressing     Problem: Wound  Goal: Optimal Coping  Outcome: Progressing  Goal: Optimal Functional Ability  Outcome: Progressing  Goal: Absence of Infection Signs and Symptoms  Outcome: Progressing  Goal: Improved Oral Intake  Outcome: Progressing  Goal: Optimal Pain Control and Function  Outcome: Progressing  Goal: Skin Health and Integrity  Outcome: Progressing  Goal: Optimal Wound Healing  Outcome: Progressing     Problem: Infection  Goal: Absence of Infection Signs and Symptoms  Outcome: Progressing     Problem: Knee Arthroplasty  Goal: Optimal Coping  Outcome: Progressing  Goal: Absence of Bleeding  Outcome: Progressing  Goal: Effective Bowel Elimination  Outcome: Progressing  Goal: Fluid and Electrolyte Balance  Outcome: Progressing  Goal: Optimal Functional Ability  Outcome: Progressing  Goal: Absence of Infection Signs and Symptoms  Outcome: Progressing  Goal: Intact Neurovascular Status  Outcome: Progressing  Goal: Anesthesia/Sedation Recovery  Outcome: Progressing  Goal: Optimal Pain Control and Function  Outcome: Progressing  Goal: Nausea and Vomiting Relief  Outcome: Progressing  Goal: Effective Urinary Elimination  Outcome: Progressing  Goal: Effective Oxygenation and Ventilation  Outcome: Progressing     Problem: Comorbidity Management  Goal: Maintenance of Asthma Control  Outcome: Progressing  Goal: Blood Pressure in Desired Range  Outcome: Progressing     Problem: Skin Injury Risk Increased  Goal: Skin Health and Integrity  Outcome: Progressing     Problem: Fall Injury Risk  Goal: Absence of Fall and Fall-Related Injury  Outcome: Progressing

## 2025-02-15 VITALS
DIASTOLIC BLOOD PRESSURE: 78 MMHG | BODY MASS INDEX: 31.39 KG/M2 | TEMPERATURE: 98 F | SYSTOLIC BLOOD PRESSURE: 155 MMHG | HEART RATE: 67 BPM | RESPIRATION RATE: 16 BRPM | WEIGHT: 200 LBS | HEIGHT: 67 IN | OXYGEN SATURATION: 94 %

## 2025-02-15 LAB
POCT GLUCOSE: 118 MG/DL (ref 70–110)
POCT GLUCOSE: 90 MG/DL (ref 70–110)

## 2025-02-15 PROCEDURE — 25000003 PHARM REV CODE 250: Performed by: NURSE PRACTITIONER

## 2025-02-15 PROCEDURE — 97110 THERAPEUTIC EXERCISES: CPT | Mod: CQ

## 2025-02-15 PROCEDURE — 97535 SELF CARE MNGMENT TRAINING: CPT

## 2025-02-15 PROCEDURE — 97530 THERAPEUTIC ACTIVITIES: CPT | Mod: CQ

## 2025-02-15 PROCEDURE — 97116 GAIT TRAINING THERAPY: CPT | Mod: CQ

## 2025-02-15 PROCEDURE — 97530 THERAPEUTIC ACTIVITIES: CPT

## 2025-02-15 PROCEDURE — 25000003 PHARM REV CODE 250: Performed by: SPECIALIST

## 2025-02-15 RX ADMIN — DOCUSATE SODIUM 200 MG: 100 CAPSULE, LIQUID FILLED ORAL at 05:02

## 2025-02-15 RX ADMIN — KETOROLAC TROMETHAMINE 10 MG: 10 TABLET, FILM COATED ORAL at 05:02

## 2025-02-15 RX ADMIN — FAMOTIDINE 20 MG: 20 TABLET, FILM COATED ORAL at 08:02

## 2025-02-15 RX ADMIN — APIXABAN 2.5 MG: 2.5 TABLET, FILM COATED ORAL at 08:02

## 2025-02-15 RX ADMIN — METHOCARBAMOL 500 MG: 500 TABLET ORAL at 12:02

## 2025-02-15 RX ADMIN — SENNOSIDES AND DOCUSATE SODIUM 2 TABLET: 50; 8.6 TABLET ORAL at 08:02

## 2025-02-15 NOTE — PLAN OF CARE
Patient is being discharged home with home health. Called and spoke to family. Gave choice. Sent referral to [facility] via [employer] with notation that patient is going to her brother's home with address and placed in follow up

## 2025-02-15 NOTE — NURSING
2/15/2025   2:25 PM    Nurse Note:   Pt left in [place] to sister in law Hung private vehicle. Maryanne at the bedside for all discharge instructions and will be assisting patient at home today and arranging for med drop off and pickup. Pt to stay at brother [facility]. Home health setup via CM, patient aware. Pt stable, no distress, ready for discharge.     Prior to discharge, the Patient/Support Person was educated and was able to verbalize understanding on following:    [x] Yes   [] No   [] Further Education Provided    Knee Replacement Specific Education   Pain management, Medication Management and Prescriptions  Activity level  Orthopedic precautions/braces - N/A  Complication Prevention to include: Constipation, post-op urinary retention, pneumonia, bleeding, falls, infection, DVT prophylaxis and nausea vomiting  Wound care Instructions/Bandages provided  Next dose due for pain and complication prevention medications      Perception of Care - Joint Replacement Population Total Joint Surgery List: KNEE    Patient able to verbalize one way to treat/prevent SWELLING at home   [x] Yes   [] No   [] Further Education Provided      Attending Nurse:  Gay

## 2025-02-15 NOTE — PT/OT/SLP PROGRESS
"Physical Therapy Treatment    Patient Name:  Tami Howard   MRN:  [id number]    Recommendations:     Discharge Recommendations: High Intensity Therapy  Discharge Equipment Recommendations: none  Barriers to discharge: None    Assessment:     Tami Howard is a 79 y.o. female admitted with a medical diagnosis of Primary osteoarthritis of right knee.  She presents with the following impairments/functional limitations: weakness, impaired endurance, gait instability, impaired functional mobility, impaired self care skills, impaired balance, decreased safety awareness  .    Rehab Prognosis: Good; patient would benefit from acute skilled PT services to address these deficits and reach maximum level of function.    Recent Surgery: Procedure(s) (LRB):  ROBOTIC ARTHROPLASTY, KNEE, TOTAL (Right) 3 Days Post-Op    Plan:     During this hospitalization, patient to be seen BID to address the identified rehab impairments via gait training, therapeutic activities, therapeutic exercises and progress toward the following goals:    Plan of Care Expires:  02/18/25    Subjective     Chief Complaint: "My knee feels stiff"  Patient/Family Comments/goals:    Pain/Comfort:         Objective:     Communicated with NSG prior to session.  Patient found up in chair with   upon PT entry to room.     General Precautions: Standard, fall  Orthopedic Precautions: RLE weight bearing as tolerated  Braces: N/A  Respiratory Status: Room air     Functional Mobility:  Transfers:     Sit to Stand:  supervision with rolling walker  Bed to Chair: supervision with  rolling walker  using  Step Transfer  Car Transfer: supervision with  rolling walker  using  Step Transfer; pt with use of leg  for RLE in order to fully self perform, much increased time to perform activity.  Gait: 100' with RW and SBA. Pt with slow yola and decreased knee flexion with swing marcus.      Treatment & Education:    (In degrees) AROM PROM   R knee flexion 65 87   R knee " extension 5 3        Patient left up in chair with call button in reach and NSG notified..    GOALS:   Multidisciplinary Problems       Physical Therapy Goals          Problem: Physical Therapy    Goal Priority Disciplines Outcome Interventions   Physical Therapy Goal     PT, PT/OT Progressing    Description: Pt will improve functional independence by performing:    Bed mobility: SBA  Sit to stand: SBA  Car Transfer: Min A  with rolling walker  Ambulation x 150' feet with SBA and rolling walker  1 Step (Curb): Min A  and rolling walker  3 Steps: Min A  and L HR  right knee AROM flexion (in degrees): 90  right knee AROM extension (in degrees): 0   Independent with total knee HEP                              Time Tracking:     PT Received On:    PT Start Time:      PT Stop Time:    PT Total Time (min):       Billable Minutes: Gait Training 20 and Therapeutic Activity 10             Number of PTA visits since last PT visit: 1     02/15/2025

## 2025-02-15 NOTE — PROGRESS NOTES
"No acute events overnight.  Pain controlled.  Resting in bed.     Vital Signs  Temp: 97.8 °F (36.6 °C)  Temp Source: Oral  Pulse: 71  Heart Rate Source: Monitor  Resp: 16  SpO2: (!) 89 %  Pulse Oximetry Type: Intermittent  Flow (L/min) (Oxygen Therapy): 2  Oxygen Concentration (%): 80  Device (Oxygen Therapy): room air  BP: (!) 113/59  BP Location: Right arm  BP Method: Automatic  Patient Position: Lying  Arousal Level: opens eyes spontaneously  Height and Weight  Height: 5' 7" (170.2 cm)  Height Method: Stated  Weight: 90.7 kg (200 lb)  Weight Method: Standard Scale  BSA (Calculated - sq m): 2.07 sq meters  BMI (Calculated): 31.3  Weight in (lb) to have BMI = 25: 159.3]    +FHL/EHL  BCR distally  Dressing c/d/i  SILT distally    Recent Lab Results         02/15/25  0543   02/14/25  1130        POCT Glucose 118   99               A/P:  Status post TKA  Pain controlled  Overall patient doing well.  Therapy for mobility and ambulation.  ASA for DVT PPx  Home when stable from therapy perspective  "

## 2025-02-15 NOTE — PT/OT/SLP PROGRESS
"Occupational Therapy   Treatment    Name: Tami Howard  MRN: [id number]  Admitting Diagnosis:  Primary osteoarthritis of right knee  3 Days Post-Op    Recommendations:     Discharge Recommendations: Low Intensity Therapy  Discharge Equipment Recommendations:  bedside commode  Barriers to discharge:  None    Assessment:     Tami Howard is a 79 y.o. female with a medical diagnosis of Primary osteoarthritis of right knee. Performance deficits affecting function are weakness, pain, decreased ROM, impaired endurance, impaired functional mobility, impaired self care skills, decreased lower extremity function, decreased safety awareness, orthopedic precautions, impaired joint extensibility.     Rehab Prognosis:  Good; patient would benefit from acute skilled OT services to address these deficits and reach maximum level of function.       Plan:     Patient to be seen  (BID) to address the above listed problems via self-care/home management, therapeutic activities, therapeutic exercises  Plan of Care Expires: 02/19/25  Plan of Care Reviewed with: patient (ex JOSH)    Subjective     Chief Complaint: "My knee is stiff."   Pain/Comfort:  Pain Rating 1: 5/10  Location - Side 1: Right  Location - Orientation 1: posterior  Location 1: knee  Pain Addressed 1: Pre-medicate for activity, Distraction    Objective:     Communicated with: JOSR Rashid prior to session.  Patient found  sleeping reclined in recliner  with peripheral IV upon OT entry to room. Pt's ex sister-in-law/good friend present during treatment.    General Precautions: Standard, fall    Orthopedic Precautions:RLE weight bearing as tolerated  Braces: N/A  Respiratory Status: Room air     Occupational Performance:     Functional Mobility/Transfers:  Patient completed Sit <> Stand Transfer with supervision  with  rolling walker   Patient completed Toilet Transfer Step Transfer technique with supervision with  rolling walker and bedside commode  OT once again " recommended purchasing a BSC for pt to use following discharge. Pt's friend/ex-JOSH present and voiced agreement and understanding. She also voiced she will likely buy one from [employer] today.  Functional Mobility: Pt ambulated ~100' with RW and SBA. Pt took two standing breaks during distance due to fatigue. Pt did require increased time to ambulate that distance. Pt handed off to SARAH Scott.     Activities of Daily Living:  Toileting: supervision + void, urine    Treatment & Education:  OT educated pt, with sister-in-law present, that walking frequently once home (once every waking hour) would be beneficial for pt's recovery from TKA, as well as blood clot prevention and constipation. Both voiced agreement and understanding. Pt also voiced that she will be taking sponge baths following discharge to ensure her knee from not getting wet, as well as her safety with transferring into/out of a tub. Patient left ambulatory in room/jorge with  SARAH Scott    GOALS:   Multidisciplinary Problems       Occupational Therapy Goals       Not on file              Multidisciplinary Problems (Resolved)          Problem: Occupational Therapy    Goal Priority Disciplines Outcome Interventions   Occupational Therapy Goal   (Resolved)     OT, PT/OT Met    Description: MET - Pt will perform LB dressing standby with AE PRN by d/c.  MET - Pt will perform toileting standby A by d/c.  MET - Pt will perform toilet t/f standby A with LRAD by d/c.  DISCONTINUE. Pt voiced she will be taking sponge baths following discharge. - Pt will perform tub t/f standby assist c TTB by d/c.                     Time Tracking:     OT Date of Treatment: 02/15/25  OT Start Time: 1237  OT Stop Time: 1300  OT Total Time (min): 23 min    Billable Minutes:Self Care/Home Management 10  Therapeutic Activity 13    OT/WAYNE: OT          2/15/2025

## 2025-02-15 NOTE — PT/OT/SLP PROGRESS
Physical Therapy Treatment    Patient Name:  Tami Howard   MRN:  [id number]    Recommendations:     Discharge Recommendations: High Intensity Therapy  Discharge Equipment Recommendations: none  Barriers to discharge:       Assessment:     Tami Howard is a 79 y.o. female admitted with a medical diagnosis of Primary osteoarthritis of right knee.  She presents with the following impairments/functional limitations: weakness, impaired endurance, gait instability, impaired functional mobility, impaired self care skills, impaired balance, decreased safety awareness  .    Rehab Prognosis: Good; patient would benefit from acute skilled PT services to address these deficits and reach maximum level of function.    Recent Surgery: Procedure(s) (LRB):  ROBOTIC ARTHROPLASTY, KNEE, TOTAL (Right) 3 Days Post-Op    Plan:     During this hospitalization, patient to be seen BID to address the identified rehab impairments via gait training, therapeutic activities, therapeutic exercises and progress toward the following goals:    Plan of Care Expires:  02/18/25    Subjective     Chief Complaint: Pt reports some minimal pain in knee.  Patient/Family Comments/goals:    Pain/Comfort:         Objective:     Communicated with NSG prior to session.  Patient found ambulatory in room/jorge with   OT, upon PT entry to room.     General Precautions: Standard, fall  Orthopedic Precautions: RLE weight bearing as tolerated  Braces: N/A  Respiratory Status: Room air     Functional Mobility:  Transfers:     Sit to Stand:  supervision with rolling walker  Bed to Chair: supervision with  rolling walker  using  Step Transfer  Gait: 100' with RW and SBA. Slow yola throughout    Treatment & Education:  Total knee exercise protocol - x10ea    Patient left up in chair with call button in reach..    GOALS:   Multidisciplinary Problems       Physical Therapy Goals          Problem: Physical Therapy    Goal Priority Disciplines Outcome Interventions    Physical Therapy Goal     PT, PT/OT Progressing    Description: Pt will improve functional independence by performing:    Bed mobility: SBA  Sit to stand: SBA  Car Transfer: Min A  with rolling walker  Ambulation x 150' feet with SBA and rolling walker  1 Step (Curb): Min A  and rolling walker  3 Steps: Min A  and L HR  right knee AROM flexion (in degrees): 90  right knee AROM extension (in degrees): 0   Independent with total knee HEP                          Time Tracking:     PT Received On:    PT Start Time:       PT Stop Time:    PT Total Time (min):       Billable Minutes: Gait Training 13 and Therapeutic Exercise 10             Number of PTA visits since last PT visit: 1     02/15/2025

## 2025-02-15 NOTE — PLAN OF CARE
Problem: Occupational Therapy  Goal: Occupational Therapy Goal  Description:     MET - Pt will perform LB dressing standby with AE PRN by d/c.  MET - Pt will perform toileting standby A by d/c.  MET - Pt will perform toilet t/f standby A with LRAD by d/c.  DISCONTINUE. Pt voiced she will be taking sponge baths following discharge. - Pt will perform tub t/f standby assist c TTB by d/c.    2/15/2025 1329 by Kimberlyn Polanco OT  Outcome: Met

## 2025-02-15 NOTE — NURSING
Had conversation at bedside regarding discharge. Pt cleared by MD,  PT and OT for home discharge but patient with concerns regarding discharge.  Pt given offer to stay another night but opted to go home to brothers house this afternoon after therapy.

## 2025-02-15 NOTE — PT/OT/SLP PROGRESS
"Occupational Therapy   Treatment    Name: Tami Howard  MRN: [id number]  Admitting Diagnosis:  Primary osteoarthritis of right knee  3 Days Post-Op    Recommendations:     Discharge Recommendations: Low Intensity Therapy  Discharge Equipment Recommendations:  bedside commode  Barriers to discharge:  None    Assessment:     Tami Howard is a 79 y.o. female with a medical diagnosis of Primary osteoarthritis of right knee. Performance deficits affecting function are weakness, pain, decreased ROM, impaired endurance, impaired functional mobility, impaired self care skills, decreased lower extremity function, decreased safety awareness, orthopedic precautions, impaired joint extensibility.     Rehab Prognosis:  Good; patient would benefit from acute skilled OT services to address these deficits and reach maximum level of function.       Plan:     Patient to be seen  (BID) to address the above listed problems via self-care/home management, therapeutic activities, therapeutic exercises  Plan of Care Expires: 02/19/25  Plan of Care Reviewed with: patient    Subjective     Chief Complaint: "I want to change my clothes and I need to use the bathroom."  Pain/Comfort:  Pain Rating 1: 0/10    Objective:     Communicated with: OJSR Rashid and Dr. Mancilla prior to session.  Patient found supine with peripheral IV upon OT entry to room.    General Precautions: Standard, fall    Orthopedic Precautions:RLE weight bearing as tolerated  Braces: N/A  Respiratory Status: Room air     Occupational Performance:     Bed Mobility:    Patient completed Supine to Sit with supervision     Functional Mobility/Transfers:  Patient completed Sit <> Stand Transfer with supervision  with  rolling walker   Patient completed Bed <> Chair Transfer using Step Transfer technique with supervision with rolling walker  Patient completed Toilet Transfer Step Transfer technique with supervision with  rolling walker and bedside commode  OT recommended pt use " a BSC once discharged. Pt voiced she does not have one, but will let her sister-in-law know to look for one to purchase.  Functional Mobility: Pt ambulated ~60' with RW and supervision. No LOB noted.  OT strongly encouraged pt to ambulate once every waking hour once discharged home to help with blood clot prevention, constipation, and pt's overall recovery from TKA. Pt voiced agreement and understanding.    Activities of Daily Living:  Grooming: supervision standing at bedside sink with RW  Upper Body Dressing: independence    Lower Body Dressing: supervision with use of AE  Toileting: supervision      Treatment & Education:    Patient left up in chair with call button in reach and RN Gay notified    GOALS:   Multidisciplinary Problems       Occupational Therapy Goals          Problem: Occupational Therapy    Goal Priority Disciplines Outcome Interventions   Occupational Therapy Goal     OT, PT/OT Progressing    Description: Pt will perform LB dressing standby with AE PRN by d/c.  Pt will perform toileting standby A by d/c.  Pt will perform toilet t/f standby A with LRAD by d/c.  Pt will perform tub t/f standby assist c TTB by d/c.                     Time Tracking:     OT Date of Treatment: 02/15/25  OT Start Time: 0830  OT Stop Time: 0853  OT Total Time (min): 23 min    Billable Minutes:Self Care/Home Management 13  Therapeutic Activity 10    OT/WAYNE: OT          2/15/2025

## 2025-02-16 LAB — POCT GLUCOSE: 157 MG/DL (ref 70–110)

## 2025-02-18 ENCOUNTER — TELEPHONE (OUTPATIENT)
Dept: ORTHOPEDICS | Facility: CLINIC | Age: 80
End: 2025-02-18
Payer: MEDICARE

## 2025-02-18 LAB — VIEW PATHOLOGY REPORT (RELIAPATH): NORMAL

## 2025-02-18 NOTE — TELEPHONE ENCOUNTER
Ortho post op follow up call completed. (R TKA) Pt states [facility] started yesterday and she has been walking with her walker around the house. She is currently staying with her brother and states he has a huge house, so she walks all over. Pt is wearing HERMILO hose, elevating right leg higher than her heart level. Advised she can use ice PRN swelling if needed. Pt denies any issues. Reminded of post op follow up appointment on 2-26 at 8am with Fernando and encouraged to call with any questions or concerns.

## 2025-02-20 ENCOUNTER — PATIENT OUTREACH (OUTPATIENT)
Dept: ADMINISTRATIVE | Facility: CLINIC | Age: 80
End: 2025-02-20
Payer: MEDICARE

## 2025-02-20 NOTE — PROGRESS NOTES
C3 nurse attempted to contact Tami Lairy for a TCC post hospital discharge follow up call. No answer. Left voicemail with callback information. The patient has a scheduled HOSFU appointment with Marcelo Henderson MD (orthopedic surgery) on 2/26/25 @ 8:00am.

## 2025-02-21 NOTE — PROGRESS NOTES
C3 nurse spoke with Tami Howard for a TCC post hospital discharge follow up call. The patient has a scheduled HOSFU/Post-Op appointment with Marcelo Henderson MD (orthopedic surgery) on 2/26/25 @ 8:00am. She does not have an appointment with PCP at this time, and C3 nurse unable to route message to patient's PCP.

## 2025-02-21 NOTE — DISCHARGE SUMMARY
[facility] - Orthopaedics  Orthopedics  Discharge Summary      Patient Name: Tami Howard  MRN: [id number]  Admission Date: 2/12/2025  Hospital Length of Stay: 2 days  Discharge Date and Time: 2/15/2025  2:25 PM  Attending Physician: No att. providers found   Discharging Provider: Krystian Mancilla MD  Primary Care Provider: Titi Irwin MD     HPI:  Complaints of right knee pain.  He has tried and failed all conservative measures.  Risks, benefits alternatives total knee arthroplasty with a detail.  All questions answered to patient's satisfaction    Procedure(s) (LRB):  ROBOTIC ARTHROPLASTY, KNEE, TOTAL (Right)      Hospital Course:  Patient underwent right Payam total knee arthroplasty on the above date.  Worked with the physical therapy begin the day of surgery.  Continue to work physical hip with a throughout his stay.  Progressing well.  By the has a discharge, the patient has completed all physical therapy guidelines.  It is about a signs were stable.  Laboratory markers were stable.  At this point he was deemed suitable for discharge.        Pending Diagnostic Studies:       None          Final Active Diagnoses:    Diagnosis Date Noted POA    PRINCIPAL PROBLEM:  Primary osteoarthritis of right knee [M17.11] 02/12/2025 Yes      Problems Resolved During this Admission:      Discharged Condition: good    Disposition: Home or Self Care    Follow Up:   Follow-up Information       Marcelo Henderson MD. Go on 2/26/2025.    Specialty: Orthopedic Surgery  Why: Ortho follow up appt on Wednesday 2/26/25 @ 8:00am margarito/ Fernando (Dr Henderson's Phy Assistant)  Contact information:  [address]  [address]  [place] [address]  [phone]               Equipment, [employer] Follow up.    Why: This is the equipment company. Call if you have questions or concerns.  Contact information:  [facility]  [place] [address]  [phone]               [facility] Follow up.     Specialties: Home Health Services, Home Therapy Services, Home Living Aide Services  Why: This is your home  health agency  Contact information:  [address] [facility] [id number] [phone]                         Patient Instructions:   No discharge procedures on file.  Discharge instructions:  Signs and symptoms of infection were discussed with the patient including but not limited to fevers chills nausea vomiting ongoing drainage.  Patient is instructed to contact the Orthopedic Service on call we will presents to the emergency department if that has any signs symptoms of infection present.      Krystian Mancilla MD  Orthopedics  [facility] - Orthopaedics

## 2025-02-26 ENCOUNTER — OFFICE VISIT (OUTPATIENT)
Dept: ORTHOPEDICS | Facility: CLINIC | Age: 80
End: 2025-02-26
Payer: MEDICARE

## 2025-02-26 ENCOUNTER — HOSPITAL ENCOUNTER (OUTPATIENT)
Dept: RADIOLOGY | Facility: CLINIC | Age: 80
Discharge: HOME | End: 2025-02-26
Attending: PHYSICIAN ASSISTANT
Payer: MEDICARE

## 2025-02-26 DIAGNOSIS — Z96.651 AFTERCARE FOLLOWING RIGHT KNEE JOINT REPLACEMENT SURGERY: Primary | ICD-10-CM

## 2025-02-26 DIAGNOSIS — Z96.651 AFTERCARE FOLLOWING RIGHT KNEE JOINT REPLACEMENT SURGERY: ICD-10-CM

## 2025-02-26 DIAGNOSIS — Z47.1 AFTERCARE FOLLOWING RIGHT KNEE JOINT REPLACEMENT SURGERY: Primary | ICD-10-CM

## 2025-02-26 DIAGNOSIS — Z47.1 AFTERCARE FOLLOWING RIGHT KNEE JOINT REPLACEMENT SURGERY: ICD-10-CM

## 2025-02-26 PROCEDURE — 73562 X-RAY EXAM OF KNEE 3: CPT | Mod: RT,,, | Performed by: PHYSICIAN ASSISTANT

## 2025-02-26 PROCEDURE — 99024 POSTOP FOLLOW-UP VISIT: CPT | Mod: POP,,, | Performed by: PHYSICIAN ASSISTANT

## 2025-02-26 RX ORDER — TRAMADOL HYDROCHLORIDE 50 MG/1
TABLET ORAL
COMMUNITY

## 2025-02-26 NOTE — PROGRESS NOTES
Chief Complaint:   Chief Complaint   Patient presents with    Post-op Evaluation     R TKA sx 02/12/2025 - pain has been tolerable. Presents with wheelchair/walker. Home health physical therapy 3 times a week        History of present illness:    This is a 79 y.o. year old female who complains of   Patient is doing well overall.    She is having home health physical therapy presently      Current Outpatient Medications   Medication Sig    albuterol (PROVENTIL/VENTOLIN HFA) 90 mcg/actuation inhaler Inhale 1 puff into the lungs every 6 (six) hours as needed.    apixaban (ELIQUIS) 2.5 mg Tab Take 1 tablet (2.5 mg total) by mouth 2 (two) times daily. Blood clot prevention    hydroCHLOROthiazide (HYDRODIURIL) 25 MG tablet Take 25 mg by mouth nightly.    losartan (COZAAR) 100 MG tablet Take 100 mg by mouth nightly.    methocarbamoL (ROBAXIN) 500 MG Tab Take 1 tablet (500 mg total) by mouth every 8 (eight) hours as needed (Muscle spasms/Thigh Pain).    metoprolol succinate (TOPROL-XL) 25 MG 24 hr tablet Take 25 mg by mouth nightly.    montelukast (SINGULAIR) 10 mg tablet Take 10 mg by mouth as needed.    multivitamin (THERAGRAN) per tablet Take 1 tablet by mouth once daily.    polyethylene glycol (GLYCOLAX) 17 gram PwPk Take 17 g by mouth once daily. Constipation Prevention for 14 days    rOPINIRole (REQUIP) 1 MG tablet Take 3 mg by mouth every evening.    sertraline (ZOLOFT) 100 MG tablet Take 100 mg by mouth every evening.    traMADoL (ULTRAM) 50 mg tablet 1 tab(s) orally Q4H for 7 day(s)     No current facility-administered medications for this visit.       Review of Systems:    Constitution:   Denies chills, fever, and sweats.  HENT:   Denies headaches or blurry vision.  Cardiovascular:  Denies chest pain or irregular heart beat.  Respiratory:   Denies cough or shortness of breath.  Gastrointestinal:  Denies abdominal pain, nausea, or vomiting.  Musculoskeletal:   Denies muscle cramps.  Neurological:   Denies dizziness  "or focal weakness.  Psychiatric/Behavior: Normal mental status.  Hematology/Lymph:  Denies bleeding problem or easy bruising/bleeding.  Skin:    Denies rash or suspicious lesions.    Examination:    Vital Signs:    Vitals:    02/26/25 0829   BP: (P) 104/68   Pulse: (P) 71   Weight: (P) 90.7 kg (199 lb 15.3 oz)   Height: (P) 5' 7" (1.702 m)       Body mass index is 31.32 kg/m² (pended).    Constitution:   Well-developed, well nourished patient in no acute distress.  Neurological:   Alert and oriented x 3 and cooperative to examination.     Psychiatric/Behavior: Normal mental status.  Respiratory:   No shortness of breath.  Eyes:    Extraoccular muscles intact  Skin:    No scars, rash or suspicious lesions.    Physical Exam:   Right Knee     Mild effusion, no redness    Surgical incision C/D/I with staples   Active flexion 90 degrees   Active extension 0 degrees    Thigh and calf compartments soft and compressible    NVI distally Weakness of the knee was observed.    X-Ray Knee:   Three views of the right knee were performed   IMPRESSIONS RADIOLOGY TEST     X-ray of knee was performed intact  knee implant           Assessment: Aftercare following right knee joint replacement surgery  -     X-Ray Knee 3 View Right; Future; Expected date: 02/26/2025         Plan:  Staples removed and Steri-Strips applied.    Wound care and dressing change instructions discussed with the patient.    Continue with physical therapy and tenderness in his an outpatient physical therapy starting next week.    Follow up with Dr. Fraire in 2 months          DISCLAIMER: This note may have been dictated using voice recognition software and may contain grammatical errors.     NOTE: Consult report sent to referring provider via EP[employer] EMR.  "

## 2025-03-11 ENCOUNTER — TELEPHONE (OUTPATIENT)
Dept: ORTHOPEDICS | Facility: CLINIC | Age: 80
End: 2025-03-11
Payer: MEDICARE

## 2025-03-11 DIAGNOSIS — M17.11 PRIMARY OSTEOARTHRITIS OF RIGHT KNEE: ICD-10-CM

## 2025-03-11 DIAGNOSIS — Z96.651 AFTERCARE FOLLOWING RIGHT KNEE JOINT REPLACEMENT SURGERY: Primary | ICD-10-CM

## 2025-03-11 DIAGNOSIS — Z47.1 AFTERCARE FOLLOWING RIGHT KNEE JOINT REPLACEMENT SURGERY: Primary | ICD-10-CM

## 2025-03-11 NOTE — TELEPHONE ENCOUNTER
Pt called pharmacy requesting a refill on her Eliquis. Pharmacy called with request, Pt stated that she is out and is supposed to take this med until 3/16/25 per her discharge papers.     Informed pt that I will send a message to providers but they are in sx today so as soon as I hear back from them I will give her a call.   She verbalized a clear understanding.

## 2025-03-11 NOTE — TELEPHONE ENCOUNTER
Pt called inquiring when her next appointment is and when she is able to drive.    Called pt back to inform her that her next visit with Dr. Henderson is 4/30/25 @ 9:30 and that as long as she is not taking any of the pain medication then she is okay to drive. Informed to call if she has any other questions or concerns, she verbalized a clear understanding.

## 2025-03-25 ENCOUNTER — TELEPHONE (OUTPATIENT)
Dept: ORTHOPEDICS | Facility: CLINIC | Age: 80
End: 2025-03-25
Payer: MEDICARE

## 2025-03-25 NOTE — TELEPHONE ENCOUNTER
Patient called left  stating that she recently had sx RT TKA on 2/12/25 but recently had a fall on 3/20/25. She reports some pain and swelling, she did state that she landed on her knees.     Called pt back to discuss this and to see how she is doing.   Patient did not answer, left [facility] requesting a call back at her convenience to discuss this.     Awaiting call back at this time.

## 2025-03-26 ENCOUNTER — OFFICE VISIT (OUTPATIENT)
Dept: ORTHOPEDICS | Facility: CLINIC | Age: 80
End: 2025-03-26
Payer: MEDICARE

## 2025-03-26 ENCOUNTER — HOSPITAL ENCOUNTER (OUTPATIENT)
Dept: RADIOLOGY | Facility: CLINIC | Age: 80
Discharge: HOME | End: 2025-03-26
Attending: PHYSICIAN ASSISTANT
Payer: MEDICARE

## 2025-03-26 VITALS
WEIGHT: 199.94 LBS | HEART RATE: 86 BPM | DIASTOLIC BLOOD PRESSURE: 90 MMHG | BODY MASS INDEX: 31.38 KG/M2 | HEIGHT: 67 IN | SYSTOLIC BLOOD PRESSURE: 136 MMHG

## 2025-03-26 DIAGNOSIS — Z47.1 AFTERCARE FOLLOWING RIGHT KNEE JOINT REPLACEMENT SURGERY: ICD-10-CM

## 2025-03-26 DIAGNOSIS — Z96.651 AFTERCARE FOLLOWING RIGHT KNEE JOINT REPLACEMENT SURGERY: ICD-10-CM

## 2025-03-26 DIAGNOSIS — Z96.652 HISTORY OF TOTAL KNEE ARTHROPLASTY, LEFT: ICD-10-CM

## 2025-03-26 DIAGNOSIS — Z96.651 STATUS POST TOTAL KNEE REPLACEMENT, RIGHT: ICD-10-CM

## 2025-03-26 DIAGNOSIS — Z96.651 AFTERCARE FOLLOWING RIGHT KNEE JOINT REPLACEMENT SURGERY: Primary | ICD-10-CM

## 2025-03-26 DIAGNOSIS — Z47.1 AFTERCARE FOLLOWING RIGHT KNEE JOINT REPLACEMENT SURGERY: Primary | ICD-10-CM

## 2025-03-26 PROCEDURE — 73562 X-RAY EXAM OF KNEE 3: CPT | Mod: 50,,, | Performed by: PHYSICIAN ASSISTANT

## 2025-03-26 PROCEDURE — 99024 POSTOP FOLLOW-UP VISIT: CPT | Mod: POP,,, | Performed by: PHYSICIAN ASSISTANT

## 2025-03-26 NOTE — PROGRESS NOTES
Past Medical History:   Diagnosis Date    Arthritis     DVT (deep venous thrombosis)     Left leg post Left TKA; 2024    HTN (hypertension)     Mild intermittent asthma, uncomplicated     Primary osteoarthritis of right knee 2025    Restless leg     Sleep apnea, unspecified     does not use machine       Past Surgical History:   Procedure Laterality Date    ARTHROSCOPY,KNEE,WITH MENISCUS REPAIR Left     CATARACT EXTRACTION W/  INTRAOCULAR LENS IMPLANT Bilateral 2014     SECTION      And     CHOLECYSTECTOMY      COLONOSCOPY      DILATION AND CURETTAGE OF UTERUS      x 6 for missed AB    HYSTERECTOMY      NASAL SINUS SURGERY      ROBOTIC ARTHROPLASTY, KNEE Right 2025    Procedure: ROBOTIC ARTHROPLASTY, KNEE, TOTAL;  Surgeon: Marcelo Henderson MD;  Location: [facility];  Service: Orthopedics;  Laterality: Right;  Payam    SHOULDER ARTHROSCOPY W/ ROTATOR CUFF REPAIR Left     tailbone surgery      TOTAL KNEE ARTHROPLASTY Left        Current Outpatient Medications   Medication Sig    albuterol (PROVENTIL/VENTOLIN HFA) 90 mcg/actuation inhaler Inhale 1 puff into the lungs every 6 (six) hours as needed.    apixaban (ELIQUIS) 2.5 mg Tab Take 1 tablet (2.5 mg total) by mouth 2 (two) times daily. Blood clot prevention    hydroCHLOROthiazide (HYDRODIURIL) 25 MG tablet Take 25 mg by mouth nightly.    losartan (COZAAR) 100 MG tablet Take 100 mg by mouth nightly.    metoprolol succinate (TOPROL-XL) 25 MG 24 hr tablet Take 25 mg by mouth nightly.    montelukast (SINGULAIR) 10 mg tablet Take 10 mg by mouth as needed.    multivitamin (THERAGRAN) per tablet Take 1 tablet by mouth once daily.    rOPINIRole (REQUIP) 1 MG tablet Take 3 mg by mouth every evening.    sertraline (ZOLOFT) 100 MG tablet Take 100 mg by mouth every evening.    traMADoL (ULTRAM) 50 mg tablet 1 tab(s) orally Q4H for 7 day(s)     No current facility-administered medications for this visit.       Review of patient's allergies indicates:    Allergen Reactions    Tylenol [acetaminophen] Other (See Comments)     Causes elevated liver enzymes        Family History   Problem Relation Name Age of Onset    Asthma Mother Leydi Lancaster     Miscarriages / Stillbirths Father Jayshree Lancaster     Depression Sister  Terri Ludwig     Hypertension Brother Warren Lancaster        Social History[1]    Chief Complaint:   Chief Complaint   Patient presents with    Pain     LISA knee pain and swelling, had a fall on 3/20/25, Rt TKA Sx 25, reports  sensitive to touch, has been having severe swelling, has been using ice for relief, wbat, ambulates with cane, reports taking advil 3x day,        Consulting Physician: No ref. provider found    History of present illness:    This is a 79 y.o. year old female who complains of pain in the right and left knees after she tripped and fell outside of a bakery 5 days ago.  She landed on both knees.  She has a few scrapes that are healing without any evidence of infection over the left knee for which she is several years postop from left total knee arthroplasty.  She has a little bit of increased swelling over the proximal tibia of the right total knee arthroplasty that is 1 month postop.  She is ambulating with the assistance of a cane today.  She finished physical therapy this morning.    Review of Systems:    Constitution:   Denies chills, fever, and sweats.  HENT:   Denies headaches or blurry vision.  Cardiovascular:  Denies chest pain or irregular heart beat.  Respiratory:   Denies cough or shortness of breath.  Gastrointestinal:  Denies abdominal pain, nausea, or vomiting.  Musculoskeletal:   Denies muscle cramps.  Neurological:   Denies dizziness or focal weakness.  Psychiatric/Behavior: Normal mental status.  Hematology/Lymph:  Denies bleeding problem or easy bruising/bleeding.  Skin:    Denies rash or suspicious lesions.    Examination:    Vital Signs:    Vitals:    25 1317   BP: (!) 136/90   Pulse: 86  "  Weight: 90.7 kg (199 lb 15.3 oz)   Height: 5' 7" (1.702 m)       Body mass index is 31.32 kg/m².    Constitution:   Well-developed, well nourished patient in no acute distress.  Neurological:   Alert and oriented x 3 and cooperative to examination.     Psychiatric/Behavior: Normal mental status.  Respiratory:   No shortness of breath.non labored breathing.  Cardiovascular: Regular rate and rhythm  Eyes:    Extraoccular muscles intact  Skin:    No scars, rash or suspicious lesions.    Physical Exam:  Right knee exam:   Mild postop swelling   No evidence of fracture or deformity   Active and passive range motion 0° to 120°   Symmetrically balanced collateral ligaments throughout range motion   No defect of the extensor mechanism   2+ pulses with intact sensory and motor function   Ambulates with a cane  Healed surgical scar  No evidence of infection    Left knee exam:   No swelling, no redness, no increased heat   No effusion   Healed old surgical scar   No defect of the extensor mechanism   Scabbed over abrasions over the anterior aspect of the knee   No evidence of infection  2+ pulses with intact sensory and motor function   Symmetrically balanced collateral ligaments throughout range of motion   Ambulates with a cane    Imaging: X-rays ordered and images interpreted today personally by me of three views of the right and left knees which show pristine interfaces and stable well-aligned bilateral total knee arthroplasties with no evidence of fracture or loosening.  Impression: As above.         Assessment: Aftercare following right knee joint replacement surgery  -     Cancel: X-Ray Knee Complete 4 Or More Views Bilat; Future; Expected date: 03/26/2025    Status post total knee replacement, right    History of total knee arthroplasty, left        Plan:  Continue with physical therapy and keep follow up appointment in April.      DISCLAIMER: This note may have been dictated using voice recognition software and may " contain grammatical errors.     NOTE: Consult report sent to referring provider via [employer]MR.         [1]   Social History  Socioeconomic History    Marital status:    Tobacco Use    Smoking status: Never    Smokeless tobacco: Never   Substance and Sexual Activity    Alcohol use: Yes     Comment: ocas    Drug use: Never    Sexual activity: Yes     Birth control/protection: Condom     Social Drivers of Health     Financial Resource Strain: Low Risk  (2/15/2025)    Overall Financial Resource Strain (CARDIA)     Difficulty of Paying Living Expenses: Not hard at all   Food Insecurity: No Food Insecurity (2/15/2025)    Hunger Vital Sign     Worried About Running Out of Food in the Last Year: Never true     Ran Out of Food in the Last Year: Never true   Physical Activity: Inactive (2/13/2025)    Exercise Vital Sign     Days of Exercise per Week: 0 days     Minutes of Exercise per Session: 0 min   Stress: No Stress Concern Present (2/15/2025)    [employer] [place] [employer] - Occupational Stress Questionnaire     Feeling of Stress : Only a little   Housing Stability: Low Risk  (2/15/2025)    Housing Stability Vital Sign     Unable to Pay for Housing in the Last Year: No     Homeless in the Last Year: No

## 2025-04-03 ENCOUNTER — EXTERNAL HOME HEALTH (OUTPATIENT)
Dept: HOME HEALTH SERVICES | Facility: HOSPITAL | Age: 80
End: 2025-04-03
Payer: MEDICARE

## 2025-04-30 ENCOUNTER — OFFICE VISIT (OUTPATIENT)
Dept: ORTHOPEDICS | Facility: CLINIC | Age: 80
End: 2025-04-30
Payer: MEDICARE

## 2025-04-30 VITALS
HEIGHT: 67 IN | SYSTOLIC BLOOD PRESSURE: 133 MMHG | DIASTOLIC BLOOD PRESSURE: 81 MMHG | HEART RATE: 61 BPM | WEIGHT: 205 LBS | BODY MASS INDEX: 32.18 KG/M2

## 2025-04-30 DIAGNOSIS — Z96.651 STATUS POST TOTAL KNEE REPLACEMENT, RIGHT: Primary | ICD-10-CM

## 2025-04-30 PROCEDURE — 99024 POSTOP FOLLOW-UP VISIT: CPT | Mod: POP,,, | Performed by: SPECIALIST

## 2025-04-30 RX ORDER — METHOCARBAMOL 750 MG/1
500 TABLET, FILM COATED ORAL 4 TIMES DAILY
COMMUNITY

## 2025-04-30 RX ORDER — TRAZODONE HYDROCHLORIDE 50 MG/1
50 TABLET ORAL NIGHTLY PRN
COMMUNITY
Start: 2025-03-31

## 2025-04-30 NOTE — PROGRESS NOTES
Past Medical History:   Diagnosis Date    Arthritis     DVT (deep venous thrombosis)     Left leg post Left TKA; 2024    HTN (hypertension)     Mild intermittent asthma, uncomplicated     Primary osteoarthritis of right knee 2025    Restless leg     Sleep apnea, unspecified     does not use machine       Past Surgical History:   Procedure Laterality Date    ARTHROSCOPY,KNEE,WITH MENISCUS REPAIR Left     CATARACT EXTRACTION W/  INTRAOCULAR LENS IMPLANT Bilateral 2014     SECTION      And     CHOLECYSTECTOMY      COLONOSCOPY      DILATION AND CURETTAGE OF UTERUS      x 6 for missed AB    HYSTERECTOMY      NASAL SINUS SURGERY      ROBOTIC ARTHROPLASTY, KNEE Right 2025    Procedure: ROBOTIC ARTHROPLASTY, KNEE, TOTAL;  Surgeon: Marcelo Henderson MD;  Location: [facility];  Service: Orthopedics;  Laterality: Right;  [facility]    SHOULDER ARTHROSCOPY W/ ROTATOR CUFF REPAIR Left     tailbone surgery      TOTAL KNEE ARTHROPLASTY Left        Current Outpatient Medications   Medication Sig    albuterol (PROVENTIL/VENTOLIN HFA) 90 mcg/actuation inhaler Inhale 1 puff into the lungs every 6 (six) hours as needed.    hydroCHLOROthiazide (HYDRODIURIL) 25 MG tablet Take 25 mg by mouth nightly.    losartan (COZAAR) 100 MG tablet Take 100 mg by mouth nightly.    methocarbamoL (ROBAXIN) 750 MG Tab Take 500 mg by mouth 4 (four) times daily.    metoprolol succinate (TOPROL-XL) 25 MG 24 hr tablet Take 25 mg by mouth nightly.    montelukast (SINGULAIR) 10 mg tablet Take 10 mg by mouth as needed.    multivitamin (THERAGRAN) per tablet Take 1 tablet by mouth once daily.    rOPINIRole (REQUIP) 1 MG tablet Take 3 mg by mouth every evening.    sertraline (ZOLOFT) 100 MG tablet Take 100 mg by mouth every evening.    traMADoL (ULTRAM) 50 mg tablet 1 tab(s) orally Q4H for 7 day(s)    traZODone (DESYREL) 50 MG tablet Take 50 mg by mouth nightly as needed.     No current facility-administered medications for this visit.  "      Review of patient's allergies indicates:   Allergen Reactions    Tylenol [acetaminophen] Other (See Comments)     Causes elevated liver enzymes        Family History   Problem Relation Name Age of Onset    Asthma Mother Leydi Lancaster     Miscarriages / Stillbirths Father Jayshree Lancaster     Depression Sister  Terri Ludwig     Hypertension Brother Warren Lancaster        Social History[1]    Chief Complaint:   Chief Complaint   Patient presents with    Right Knee - Follow-up     Pt states she is doing fairly well. Attending PT 3x a week. Reports a few episodes of swelling radiating into her ankle. Having intermittent pain and soreness most days of the week. Needs refill for Tramadol.        Consulting Physician: No ref. provider found    History of present illness:    This is a 79 y.o. year old female who complains of soreness in the right knee 2 months postop from right total knee arthroplasty.  Patient is still in physical therapy and is improving.    Review of Systems:    Constitution:   Denies chills, fever, and sweats.  HENT:   Denies headaches or blurry vision.  Cardiovascular:  Denies chest pain or irregular heart beat.  Respiratory:   Denies cough or shortness of breath.  Gastrointestinal:  Denies abdominal pain, nausea, or vomiting.  Musculoskeletal:   Denies muscle cramps.  Neurological:   Denies dizziness or focal weakness.  Psychiatric/Behavior: Normal mental status.  Hematology/Lymph:  Denies bleeding problem or easy bruising/bleeding.  Skin:    Denies rash or suspicious lesions.    Examination:    Vital Signs:    Vitals:    25 0945 25 0949   BP: 133/81    Pulse: 61    Weight: 93 kg (205 lb)    Height: 5' 7" (1.702 m)    PainSc:    3       Body mass index is 32.11 kg/m².    Constitution:   Well-developed, well nourished patient in no acute distress.  Neurological:   Alert and oriented x 3 and cooperative to examination.     Psychiatric/Behavior: Normal mental status.  Respiratory: "   No shortness of breath.non labored breathing.  Cardiovascular: Regular rate and rhythm  Eyes:    Extraoccular muscles intact  Skin:    No scars, rash or suspicious lesions.    Physical Exam:  Right Knee     No swelling, no erythema, no increase heat    Mild tenderness    Well healed scar    Symmetrically balanced collateral ligaments throughout ROM    No instability of the knee in mid or deep flexion     Active flexion 115 degrees     Active extension 0 degrees     No weakness observed.    Normal gait    Intact sensory and motor function    Palpable pedal pulses    Imaging: X-rays reviewed and images interpreted today personally by me of three views of the right knee which show pristine interfaces and stable well-aligned right total knee arthroplasty.         Assessment: Status post total knee replacement, right        Plan:  Continue with physical therapy and follow up for repeat clinical exam in 6 weeks.      DISCLAIMER: This note may have been dictated using voice recognition software and may contain grammatical errors.     NOTE: Consult report sent to referring provider via [employer].         [1]   Social History  Socioeconomic History    Marital status:    Tobacco Use    Smoking status: Never    Smokeless tobacco: Never   Substance and Sexual Activity    Alcohol use: Yes     Comment: ocas    Drug use: Never    Sexual activity: Yes     Partners: Male     Birth control/protection: Condom     Social Drivers of Health     Financial Resource Strain: Low Risk  (2/15/2025)    Overall Financial Resource Strain (CARDIA)     Difficulty of Paying Living Expenses: Not hard at all   Food Insecurity: No Food Insecurity (2/15/2025)    Hunger Vital Sign     Worried About Running Out of Food in the Last Year: Never true     Ran Out of Food in the Last Year: Never true   Physical Activity: Inactive (2/13/2025)    Exercise Vital Sign     Days of Exercise per Week: 0 days     Minutes of Exercise per Session: 0 min    Stress: No Stress Concern Present (2/15/2025)    [employer] [place] [employer] - Occupational Stress Questionnaire     Feeling of Stress : Only a little   Housing Stability: Low Risk  (2/15/2025)    Housing Stability Vital Sign     Unable to Pay for Housing in the Last Year: No     Homeless in the Last Year: No

## 2025-06-18 ENCOUNTER — OFFICE VISIT (OUTPATIENT)
Dept: ORTHOPEDICS | Facility: CLINIC | Age: 80
End: 2025-06-18
Payer: MEDICARE

## 2025-06-18 VITALS
SYSTOLIC BLOOD PRESSURE: 111 MMHG | HEIGHT: 67 IN | HEART RATE: 64 BPM | BODY MASS INDEX: 32.02 KG/M2 | WEIGHT: 204 LBS | DIASTOLIC BLOOD PRESSURE: 67 MMHG

## 2025-06-18 DIAGNOSIS — Z47.1 AFTERCARE FOLLOWING RIGHT KNEE JOINT REPLACEMENT SURGERY: Primary | ICD-10-CM

## 2025-06-18 DIAGNOSIS — M75.121 NONTRAUMATIC COMPLETE TEAR OF RIGHT ROTATOR CUFF: ICD-10-CM

## 2025-06-18 DIAGNOSIS — Z96.651 AFTERCARE FOLLOWING RIGHT KNEE JOINT REPLACEMENT SURGERY: Primary | ICD-10-CM

## 2025-06-18 PROCEDURE — 99213 OFFICE O/P EST LOW 20 MIN: CPT | Mod: ,,, | Performed by: SPECIALIST

## 2025-06-18 PROCEDURE — 20610 DRAIN/INJ JOINT/BURSA W/O US: CPT | Mod: RT,,, | Performed by: PHYSICIAN ASSISTANT

## 2025-06-18 RX ORDER — BETAMETHASONE SODIUM PHOSPHATE AND BETAMETHASONE ACETATE 3; 3 MG/ML; MG/ML
12 INJECTION, SUSPENSION INTRA-ARTICULAR; INTRALESIONAL; INTRAMUSCULAR; SOFT TISSUE
Status: DISCONTINUED | OUTPATIENT
Start: 2025-06-18 | End: 2025-06-18 | Stop reason: HOSPADM

## 2025-06-18 RX ORDER — LIDOCAINE HYDROCHLORIDE 20 MG/ML
2 INJECTION, SOLUTION INFILTRATION; PERINEURAL
Status: DISCONTINUED | OUTPATIENT
Start: 2025-06-18 | End: 2025-06-18 | Stop reason: HOSPADM

## 2025-06-18 RX ADMIN — BETAMETHASONE SODIUM PHOSPHATE AND BETAMETHASONE ACETATE 12 MG: 3; 3 INJECTION, SUSPENSION INTRA-ARTICULAR; INTRALESIONAL; INTRAMUSCULAR; SOFT TISSUE at 09:06

## 2025-06-18 RX ADMIN — LIDOCAINE HYDROCHLORIDE 2 MG: 20 INJECTION, SOLUTION INFILTRATION; PERINEURAL at 09:06

## 2025-06-18 NOTE — PROGRESS NOTES
Chief Complaint:   Chief Complaint   Patient presents with    Follow-up     4months out Rt TKA Sx 25 patient states it feels good she sleep with no pain in her knee some trouble going from sitting and standing to sitting and walking long. She is finish with PT and not sure if she needs more. She also wants to talk about a Rt Shoulder replacement and/or injection.       History of present illness:    79-year-old right-hand dominant female presents office today for a three-month follow-up on her right knee.  She is now 4 months S/P right total knee arthroplasty doing very well.  She has completed therapy.  She is inquiring about a right shoulder injection.  She has a known history of chronic rotator cuff tearing that will ultimately require shoulder replacement.  She has had increase in shoulder pain while doing her therapy for her knee.    Past Medical History:   Diagnosis Date    Arthritis     DVT (deep venous thrombosis)     Left leg post Left TKA; 2024    HTN (hypertension)     Mild intermittent asthma, uncomplicated     Primary osteoarthritis of right knee 2025    Restless leg     Sleep apnea, unspecified     does not use machine       Past Surgical History:   Procedure Laterality Date    ARTHROSCOPY,KNEE,WITH MENISCUS REPAIR Left     CATARACT EXTRACTION W/  INTRAOCULAR LENS IMPLANT Bilateral      SECTION      And     CHOLECYSTECTOMY      COLONOSCOPY      DILATION AND CURETTAGE OF UTERUS      x 6 for missed AB    HYSTERECTOMY      NASAL SINUS SURGERY      ROBOTIC ARTHROPLASTY, KNEE Right 2025    Procedure: ROBOTIC ARTHROPLASTY, KNEE, TOTAL;  Surgeon: Marcelo Henderosn MD;  Location: [facility];  Service: Orthopedics;  Laterality: Right;  [facility]    SHOULDER ARTHROSCOPY W/ ROTATOR CUFF REPAIR Left     tailbone surgery      TOTAL KNEE ARTHROPLASTY Left        Current Outpatient Medications   Medication Sig    albuterol (PROVENTIL/VENTOLIN HFA) 90 mcg/actuation inhaler Inhale 1 puff  "into the lungs every 6 (six) hours as needed.    hydroCHLOROthiazide (HYDRODIURIL) 25 MG tablet Take 25 mg by mouth nightly.    losartan (COZAAR) 100 MG tablet Take 100 mg by mouth nightly.    metoprolol succinate (TOPROL-XL) 25 MG 24 hr tablet Take 25 mg by mouth nightly.    montelukast (SINGULAIR) 10 mg tablet Take 10 mg by mouth as needed.    multivitamin (THERAGRAN) per tablet Take 1 tablet by mouth once daily.    rOPINIRole (REQUIP) 1 MG tablet Take 3 mg by mouth every evening.    sertraline (ZOLOFT) 100 MG tablet Take 100 mg by mouth every evening.    traMADoL (ULTRAM) 50 mg tablet 1 tab(s) orally Q4H for 7 day(s)    traZODone (DESYREL) 50 MG tablet Take 50 mg by mouth nightly as needed.    methocarbamoL (ROBAXIN) 750 MG Tab Take 500 mg by mouth 4 (four) times daily.     No current facility-administered medications for this visit.       Review of patient's allergies indicates:   Allergen Reactions    Tylenol [acetaminophen] Other (See Comments)     Causes elevated liver enzymes        Family History   Problem Relation Name Age of Onset    Asthma Mother Leydi Saini Tonny     Miscarriages / Stillbirths Father Jayshree Lancaster     Depression Sister  Terri Ludwig     Hypertension Brother Warren Lancaster        Social History[1]      Review of Systems:    Constitution:   Denies chills, fever, and sweats.  HENT:   Denies headaches or blurry vision.  Cardiovascular:  Denies chest pain or irregular heart beat.  Respiratory:   Denies cough or shortness of breath.  Gastrointestinal:  Denies abdominal pain, nausea, or vomiting.  Musculoskeletal:   Denies muscle cramps.  Neurological:   Denies dizziness or focal weakness.  Psychiatric/Behavior: Normal mental status.  Hematology/Lymph:  Denies bleeding problem or easy bruising/bleeding.  Skin:    Denies rash or suspicious lesions.    Examination:    Vital Signs:    Vitals:    25 1008   BP: 111/67   Pulse: 64   Weight: 92.5 kg (204 lb)   Height: 5' 7" (1.702 m) "       Body mass index is 31.95 kg/m².    Constitution:   Well-developed, well nourished patient in no acute distress.  Neurological:   Alert and oriented x 3 and cooperative to examination.     Psychiatric/Behavior: Normal mental status.  Respiratory:   No shortness of breath.  Nonlabored breathing  Cardiovascular:           Regular rate and rhythm  Eyes:    Extraoccular muscles intact  Skin:    No scars, rash or suspicious lesions.    Physical Exam:     Right Knee     No swelling, warmth    Well healed scar    No instability of the knee in mid or deep flexion     Active flexion 120 degrees     Active extension 0 degrees     No weakness observed.    Right shoulder exam   No swelling, no erythema   Atrophy seen of the deltoid   Tenderness over the subacromial space, deltoid   She has active shoulder abduction 120°   Active shoulder forward elevation 140°   Active external rotation 60°   She does have some weakness with resisted rotator cuff testing  Sensation intact distally   Radial pulses 2+        Assessment:     Aftercare following right knee joint replacement surgery    Nontraumatic complete tear of right rotator cuff  -     Large Joint Aspiration/Injection: R subacromial bursa        Plan:      Low-impact exercising, activities as tolerated for her right knee.  We will see her back in 9 months for her yearly checkup with right knee radiographs needed at that time.  Right shoulder subacromial corticosteroid injection given today.  Patient tolerated procedure well.  She is interested in future shoulder replacement so when she is ready to pursue this then we will refer to 1 of the sports medicine physicians to discuss shoulder surgery.       Clinical Reference Documents Added to Patient Instructions         Document    EXERCISE BAND EXERCISES FOR THE SHOULDER (ENGLISH)            Follow up in about 9 months (around 3/18/2026).    DISCLAIMER: This note may have been dictated using voice recognition software and may  contain grammatical errors.          [1]   Social History  Socioeconomic History    Marital status:    Tobacco Use    Smoking status: Never    Smokeless tobacco: Never   Substance and Sexual Activity    Alcohol use: Yes     Comment: ocas    Drug use: Never    Sexual activity: Yes     Partners: Male     Birth control/protection: Condom     Social Drivers of Health     Financial Resource Strain: Low Risk  (2/15/2025)    Overall Financial Resource Strain (CARDIA)     Difficulty of Paying Living Expenses: Not hard at all   Food Insecurity: No Food Insecurity (2/15/2025)    Hunger Vital Sign     Worried About Running Out of Food in the Last Year: Never true     Ran Out of Food in the Last Year: Never true   Physical Activity: Inactive (2/13/2025)    Exercise Vital Sign     Days of Exercise per Week: 0 days     Minutes of Exercise per Session: 0 min   Stress: No Stress Concern Present (2/15/2025)    [employer] [place] [employer] - Occupational Stress Questionnaire     Feeling of Stress : Only a little   Housing Stability: Low Risk  (2/15/2025)    Housing Stability Vital Sign     Unable to Pay for Housing in the Last Year: No     Homeless in the Last Year: No

## 2025-06-18 NOTE — PROCEDURES
Large Joint Aspiration/Injection: R subacromial bursa    Date/Time: 6/18/2025 9:45 AM    Performed by: Xander Schmitt PA  Authorized by: Xander Schmitt PA    Consent Done?:  Yes (Verbal)  Indications:  Pain  Site marked: the procedure site was marked    Timeout: prior to procedure the correct patient, procedure, and site was verified    Prep: patient was prepped and draped in usual sterile fashion    Approach:  Posterior  Location:  Shoulder  Site:  R subacromial bursa  Medications:  2 mg LIDOcaine HCL 20 mg/ml (2%) 20 mg/mL (2 %); 12 mg betamethasone acetate-betamethasone sodium phosphate 6 mg/mL  Patient tolerance:  Patient tolerated the procedure well with no immediate complications

## (undated) DEVICE — WRAP DEMAYO LEG STERILE

## (undated) DEVICE — KIT CHECKPOINT TIBIAL

## (undated) DEVICE — PENCIL SMOKE EVAC TELSCP 15FT

## (undated) DEVICE — CUFF ATS 2 PORT SNGL BLDR 34IN

## (undated) DEVICE — DRAPE STERI U-SHAPED 47X51IN

## (undated) DEVICE — KIT SURGICAL TURNOVER

## (undated) DEVICE — SOL NACL IRR 1000ML BTL

## (undated) DEVICE — TAPE SILK 3IN

## (undated) DEVICE — KIT DRAPE RIO ONE PIECE W/POCK

## (undated) DEVICE — SPONGE COTTON TRAY 4X4IN

## (undated) DEVICE — CUSHION  WC FOAM 20X20X.75IN

## (undated) DEVICE — COVER TABLE HVY DTY 60X90IN

## (undated) DEVICE — DRAPE MEDIUM SHEET 40X70IN

## (undated) DEVICE — ELECTRODE PATIENT RETURN DISP

## (undated) DEVICE — CORD SILICONE RETRACTOR

## (undated) DEVICE — KIT VIZADISC KNEE TRACKING

## (undated) DEVICE — PIN BONE 3.2X110MM
Type: IMPLANTABLE DEVICE | Site: KNEE | Status: NON-FUNCTIONAL
Removed: 2025-02-12

## (undated) DEVICE — GOWN POLY REINF X-LONG 2XL

## (undated) DEVICE — SYR 10CC LUER LOCK

## (undated) DEVICE — APPLICATOR CHLORAPREP ORN 26ML

## (undated) DEVICE — GLOVE SENSICARE PI MICRO 8

## (undated) DEVICE — GLOVE SENSICARE PI GRN 8

## (undated) DEVICE — SOL NACL IRR 3000ML

## (undated) DEVICE — KIT TRIATHLON CR TIB PREP SZ4

## (undated) DEVICE — PADDING WYTEX UNDRCST 6INX4YD

## (undated) DEVICE — PIN FIXATION BONE 140X3.2MM
Type: IMPLANTABLE DEVICE | Site: KNEE | Status: NON-FUNCTIONAL
Removed: 2025-02-12

## (undated) DEVICE — BLADE MAKO STANDARD

## (undated) DEVICE — Device

## (undated) DEVICE — HOOD FLYTE SURGICOOL

## (undated) DEVICE — SYR 30CC LUER LOCK

## (undated) DEVICE — SUT VICRYL 2-0 36 CT-1

## (undated) DEVICE — DRAPE FULL SHEET 70X100IN

## (undated) DEVICE — PAD ABDOMINAL STERILE 8X10IN

## (undated) DEVICE — SUT STRATAFIX PDS+ 1 CTX 24IN

## (undated) DEVICE — SOL POVIDONE IODINE PCH 3/4OZ

## (undated) DEVICE — BLADE SAG DUAL CUT 25X90MM

## (undated) DEVICE — SUT ETHIBOND XTRA 1 CTX